# Patient Record
Sex: FEMALE | Race: WHITE | Employment: OTHER | URBAN - METROPOLITAN AREA
[De-identification: names, ages, dates, MRNs, and addresses within clinical notes are randomized per-mention and may not be internally consistent; named-entity substitution may affect disease eponyms.]

---

## 2017-02-15 PROBLEM — N32.81 OVERACTIVE BLADDER: Status: ACTIVE | Noted: 2017-02-15

## 2018-08-29 ENCOUNTER — HOSPITAL ENCOUNTER (OUTPATIENT)
Dept: MAMMOGRAPHY | Age: 73
Discharge: HOME | End: 2018-08-29
Attending: INTERNAL MEDICINE
Payer: MEDICARE

## 2018-08-29 DIAGNOSIS — Z12.31 VISIT FOR SCREENING MAMMOGRAM: ICD-10-CM

## 2018-08-29 PROCEDURE — 77067 SCR MAMMO BI INCL CAD: CPT

## 2018-08-30 ENCOUNTER — HOSPITAL ENCOUNTER (OUTPATIENT)
Dept: PET IMAGING | Age: 73
Discharge: HOME | End: 2018-08-30
Payer: MEDICARE

## 2018-08-30 DIAGNOSIS — R63.4 WEIGHT LOSS: ICD-10-CM

## 2018-08-30 DIAGNOSIS — F17.210 TOBACCO DEPENDENCE DUE TO CIGARETTES: ICD-10-CM

## 2018-08-30 DIAGNOSIS — D38.5 NEOPLASM OF UNCERTAIN BEHAVIOR OF OTHER RESPIRATORY ORGANS: ICD-10-CM

## 2018-08-30 DIAGNOSIS — R91.8 MASS OF RIGHT LUNG: ICD-10-CM

## 2018-08-30 PROCEDURE — A9552 F18 FDG: HCPCS

## 2018-08-30 PROCEDURE — 74011636320 HC RX REV CODE- 636/320: Performed by: INTERNAL MEDICINE

## 2018-08-30 RX ORDER — SODIUM CHLORIDE 0.9 % (FLUSH) 0.9 %
5-10 SYRINGE (ML) INJECTION
Status: COMPLETED | OUTPATIENT
Start: 2018-08-30 | End: 2018-08-30

## 2018-08-30 RX ADMIN — DIATRIZOATE MEGLUMINE AND DIATRIZOATE SODIUM 10 ML: 660; 100 LIQUID ORAL; RECTAL at 11:21

## 2018-08-30 RX ADMIN — Medication 10 ML: at 11:21

## 2018-09-06 PROBLEM — S83.209A CURRENT TEAR OF MENISCUS OF KNEE: Status: ACTIVE | Noted: 2017-06-26

## 2018-09-06 PROBLEM — E11.65 TYPE II OR UNSPECIFIED TYPE DIABETES MELLITUS WITH RENAL MANIFESTATIONS, UNCONTROLLED(250.42) (HCC): Status: ACTIVE | Noted: 2018-05-08

## 2018-09-06 PROBLEM — E11.29 TYPE II OR UNSPECIFIED TYPE DIABETES MELLITUS WITH RENAL MANIFESTATIONS, UNCONTROLLED(250.42) (HCC): Status: ACTIVE | Noted: 2018-05-08

## 2018-09-06 PROBLEM — F42.4 EXCORIATION (SKIN-PICKING) DISORDER: Status: ACTIVE | Noted: 2018-05-08

## 2018-09-06 PROBLEM — J41.0 SIMPLE CHRONIC BRONCHITIS (HCC): Status: ACTIVE | Noted: 2018-09-06

## 2018-09-06 PROBLEM — Z72.0 TOBACCO ABUSE: Status: ACTIVE | Noted: 2018-09-06

## 2018-09-06 PROBLEM — R91.1 PULMONARY NODULE: Status: ACTIVE | Noted: 2018-09-06

## 2018-09-06 PROBLEM — S42.309A CLOSED FRACTURE OF HUMERUS: Status: ACTIVE | Noted: 2017-06-26

## 2020-07-21 ENCOUNTER — HOSPITAL ENCOUNTER (OUTPATIENT)
Dept: MRI IMAGING | Age: 75
Discharge: HOME | End: 2020-07-21
Attending: INTERNAL MEDICINE
Payer: OTHER GOVERNMENT

## 2020-07-21 DIAGNOSIS — M54.50 LOW BACK PAIN: ICD-10-CM

## 2020-07-21 PROCEDURE — 72148 MRI LUMBAR SPINE W/O DYE: CPT

## 2020-08-20 ENCOUNTER — HOSPITAL ENCOUNTER (OUTPATIENT)
Dept: CT IMAGING | Age: 75
Discharge: HOME | End: 2020-08-20
Attending: INTERNAL MEDICINE
Payer: OTHER GOVERNMENT

## 2020-08-20 DIAGNOSIS — S32.10XA SACRAL FRACTURE (HCC): ICD-10-CM

## 2020-08-20 PROCEDURE — 72192 CT PELVIS W/O DYE: CPT

## 2021-07-26 ENCOUNTER — HOSPITAL ENCOUNTER (OUTPATIENT)
Dept: PHYSICAL THERAPY | Age: 76
Discharge: HOME | End: 2021-07-26
Payer: MEDICARE

## 2021-07-26 PROCEDURE — 97535 SELF CARE MNGMENT TRAINING: CPT

## 2021-07-26 PROCEDURE — 97166 OT EVAL MOD COMPLEX 45 MIN: CPT

## 2021-07-26 NOTE — THERAPY EVALUATION
Diana Spears  : 1945  Primary: Anna Trevizo Medicare Advantage  Secondary:  [address]  [facility] [address] [address],[address], [facility] [facility].  [phone]   [phone]         OUTPATIENT OCCUPATIONAL [place] Assessment 2021   ICD-10: Treatment Diagnosis:   Lymphedema, not elsewhere classified (I89.0)  Precautions/Allergies:   Aspirin, Barbiturates, Codeine, Demerol [meperidine], Indomethacin, Penicillins, and Sulfa (sulfonamide antibiotics)   TREATMENT PLAN:  Effective Dates: 2021 TO 10/24/2021 (90 days). Frequency/Duration: 2 times a week for 90 Day(s) MEDICAL/REFERRING DIAGNOSIS:  LYMPHEDEMA   DATE OF ONSET: chronic, worsening over the past 6 months  REFERRING PHYSICIAN: Amina Canas MD MD Orders: evaluate and treat  Return MD Appointment: unknown     INITIAL ASSESSMENT:  Ms. Neri Yeh presents with pain and edema in bilateral LEs; she is limited in her mobility and spends most of her day in her wheelchair with her legs in a gravity-dependent position. Feel she may benefit from skilled occupational therapy to maximize functional independence with home management of lymphedema. PROBLEM LIST (Impacting functional limitations):  1. Decreased Strength  2. Decreased ADL/Functional Activities  3. Increased Pain  4. Edema/Girth  5. Decreased Skin Integrity/Hygeine INTERVENTIONS PLANNED: (Treatment may consist of any combination of the following)  1. Activities of daily living training  2. Manual therapy training  3. Therapeutic activity  4. Therapeutic exercise     GOALS: (Goals have been discussed and agreed upon with patient.)  Short-Term Functional Goals: Time Frame: 45 days  1. The patient/caregiver will verbalize understanding of lymphedema precautions. 2. Patient will be independent with skin care regimen. 3. Patient will be independent in lymphatic exercises. Discharge Goals: Time Frame: 90 days  1.  Patient's bilateral lower extremity circumferential measurements will decrease on volumetric graph by 1-3 cm to maximize functional use in ADL's.   2. The patient/caregiver will be independent with home management of lymphedema. 3. Patient/caregiver will be independent donning and doffing bilateral lower extremity compression garment. OUTCOME MEASURE:   Tool Used: Lymphedema Life Impact Scale   Score:  Initial: 47 Most Recent: X (Date: -- )   Interpretation of Score: The Lymphedema Life Impact Scale (LLIS) is a validated instrument that measures the physical, functional, and psychosocial concerns pertinent to patients with extremity lymphedema. The Scale's questionnaire is administered to patients to gauge impairments, activity limitations, and participation restrictions resulting from their lymphedema. MEDICAL NECESSITY:   · Patient demonstrates fair rehab potential due to higher previous functional level. REASON FOR SERVICES/OTHER COMMENTS:  · Patient continues to require skilled intervention due to decreased independence with home management of lymphedema. Total Duration:  OT Patient Time In/Time Out  Time In: 1525  Time Out: 1615    Rehabilitation Potential For Stated Goals: Fair  Regarding Rosario Rivera's therapy, I certify that the treatment plan above will be carried out by a therapist or under their direction. Thank you for this referral,  Tara Saucedo OT     Referring Physician Signature: Teresa Patel MD _______________________________ Date _____________     PAIN/SUBJECTIVE:   Initial: Pain Intensity 1: 7  Pain Location 1: Leg, Foot  Pain Orientation 1: Right, Left   Post Session:  7/10   OCCUPATIONAL PROFILE & HISTORY:   History of Injury/Illness (Reason for Referral):  Patient reports chronic neuropathy pain and edema in LEs, worsening over the past 6 months to a year. She reports difficulty with all ADLs and iADLs and does not have assistance at home.  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 Past Medical History/Comorbidities:   Ms. Li Zazueta  has a past medical history of Diabetes ([address] Hypertension, and Lung cancer ([facility] [place] [address] (2017). Ms. Li Zazueta  has no past surgical history on file. Social History/Living Environment:   Home Environment: Private residence  # Steps to Enter: 3  Rails to Enter: Yes  Hand Rails : Bilateral  One/Two Story Residence: One story  Living Alone: Yes  Support Systems: Family member(s)  Current DME Used/Available at Home: Hospital bed, Shower chair, Walker, rollator, Wheelchair, Raised toilet seat, Grab bars (Pt. doesn't use rollator)  Tub or Shower Type: Tub/Shower combination  Prior Level of Function/Work/Activity:  Disabled    Personal Factors:          Sex:  female        Age:  67 y.o. Ambulatory/Rehab Services H2 Model Falls Risk Assessment   Risk Factors:       No Risk Factors Identified Ability to Rise from Chair:       (3)  Multiple attempts, but successful   Falls Prevention Plan: Mobility Assistance Device (specify):  required wheelchair assistance from car to therapy clinic today   Total: (5 or greater = High Risk): 3   ©2010 [facility] [place] [address] [place] [address] States Patent #[id number]. Federal Law prohibits the replication, distribution or use without written permission from [facility] of [employer]   Current Medications:       Current Outpatient Medications:     albuterol (PROVENTIL HFA, VENTOLIN HFA, PROAIR HFA) 90 mcg/actuation inhaler, Take 2 Puffs by inhalation every four (4) hours as needed for Wheezing., Disp: 1 Inhaler, Rfl: 11    tolterodine ER (DETROL LA) 4 mg ER capsule, Take 1 Cap by mouth daily. , Disp: 30 Cap, Rfl: 11    solifenacin (VESICARE) 10 mg tablet, Take 1 Tab by mouth daily. , Disp: 30 Tab, Rfl: 12    insulin glargine (LANTUS SOLOSTAR) 100 unit/mL (3 mL) pen, by SubCUTAneous route., Disp: , Rfl:     mirabegron ER (MYRBETRIQ) 50 mg ER tablet, Take 1 Tab by mouth daily. , Disp: 30 Tab, Rfl: 12    naproxen (NAPROSYN) 500 mg tablet, Take 500 mg by mouth two (2) times daily (with meals). , Disp: , Rfl:     gabapentin (NEURONTIN) 100 mg capsule, Take 100 mg by mouth daily. , Disp: , Rfl:     simvastatin (ZOCOR) 40 mg tablet, Take 40 mg by mouth nightly., Disp: , Rfl:     lisinopril (PRINIVIL, ZESTRIL) 10 mg tablet, Take 10 mg by mouth daily. , Disp: , Rfl:     loratadine 10 mg cap, Take 10 mg by mouth daily. , Disp: , Rfl:     docusate sodium (COLACE) 100 mg capsule, Take 100 mg by mouth two (2) times a day., Disp: , Rfl:     FLUoxetine (PROZAC) 20 mg tablet, Take 20 mg by mouth daily. , Disp: , Rfl:     omeprazole (PRILOSEC) 20 mg capsule, Take 20 mg by mouth daily. , Disp: , Rfl:     guaiFENesin (ORGANIDIN) 400 mg tablet, Take 400 mg by mouth two (2) times a day., Disp: , Rfl:     phenazopyridine (PYRIDIUM) 100 mg tablet, Take 100 mg by mouth as needed for Pain., Disp: , Rfl:     insulin NPH (NOVOLIN N) 100 unit/mL injection, by SubCUTAneous route once., Disp: , Rfl:    Date Last Reviewed: 7/26/2021     Complexity Level: Expanded review of therapy/medical records (1-2):  MODERATE COMPLEXITY   ASSESSMENT OF OCCUPATIONAL PERFORMANCE:   Palpation:          Notable for pitting edema in bilateral LEs  ROM:          WDLs  Strength:          Grossly 3+/5 bilateral LEs  Skin Integrity:          Noted thickening hyperpigmentation around skin folds of feet; patient reports it is difficult for her to bathe her feet  Mental Status:             · Neurologic State: Alert  · Orientation Level: Oriented X4  · Cognition: Appropriate decision making  · Perception: Appears intact  · Perseveration: Perseverates during conversation  · Safety/Judgement: Insight into deficits     Edema/Girth:  pitting    Left Right    Initial Most Recent Initial Most Recent   Upper  Extremity           Lower  Extremity 5th Tuberosity (cm): 21  Ankle (cm): 27.5  Calf (cm): 43.7  Mid Knee (cm): 37.3 5th Tuberosity (cm): 21.2 (18 cm from base of heel)  Ankle (cm): 27.5 (10 cm from base of heel)  Calf (cm): 43.2 (30 cm from base of heel)  Mid Knee (cm): 38.6 (40 cm from base of heel)        Activities of Daily Living:           Basic ADLs (From Assessment) Complex ADLs (From Assessment)   Basic ADL  Feeding: Modified independent  Oral Facial Hygiene/Grooming: Modified Independent  Bathing: Modified independent, Additional time  Type of Bath: Shower  Upper Body Dressing: Modified independent, Additional time  Lower Body Dressing: Modified independent, Additional time  Toileting: Modified independent, Additional time Instrumental ADL  Meal Preparation: Modified independent (very simple meals)  Homemaking:  (Patient reports she needs help with housework)   Grooming/Bathing/Dressing Activities of Daily Living     Cognitive Retraining  Safety/Judgement: Insight into deficits                 Functional Transfers  Bathroom Mobility: Modified independent  Toilet Transfer : Modified independent  Tub Transfer: Modified independent; Adaptive equipment; Additional time  Car Transfer: Modified independent     Bed/Mat Mobility  Rolling: Independent  Supine to Sit: Modified independent (Uses trapeize bar)  Sit to Supine: Modified independent  Sit to Stand: Modified independent  Stand to Sit: Modified independent  Bed to Chair: Modified independent  Scooting: Modified independent     Sensation:         Patient reports neuropathy in bilateral LEs   Physical Skills Involved:  1. Strength  2. Sensation  3. Pain (Chronic)  4. Edema  5. Skin Integrity Cognitive Skills Affected (resulting in the inability to perform in a timely and safe manner):  1. None Psychosocial Skills Affected:  1. Habits/Routines  2.  Environmental Adaptation   Number of elements that affect the Plan of Care[de-identified] 5+:  HIGH COMPLEXITY   CLINICAL DECISION MAKING:   Clinical Decision-Making Assessment:  Patient's limited mobility and lack of assistance may impact her ability to progress toward therapy goals.    Assessment process, impact of co-morbidities, assessment modification\need for assistance, and selection of interventions: Analytical Complexity:MODERATE COMPLEXITY

## 2021-07-29 ENCOUNTER — HOSPITAL ENCOUNTER (OUTPATIENT)
Dept: PHYSICAL THERAPY | Age: 76
Discharge: HOME | End: 2021-07-29
Payer: MEDICARE

## 2021-07-29 PROCEDURE — 97535 SELF CARE MNGMENT TRAINING: CPT

## 2021-07-29 PROCEDURE — 97140 MANUAL THERAPY 1/> REGIONS: CPT

## 2021-07-29 NOTE — PROGRESS NOTES
Vani Score  : 1945  Primary: Katherine Jenkins Medicare Advantage  Secondary:  [address]  at [facility]  [address] [facility], [address] [place] [address],[address], [address] [facility]  [phone]   [phone]       OUTPATIENT OCCUPATIONAL THERAPY: Daily Treatment Note 2021  Visit Count:  2    ICD-10: Treatment Diagnosis:   Lymphedema, not elsewhere classified (I89.0)  Precautions/Allergies:   Aspirin, Barbiturates, Codeine, Demerol [meperidine], Indomethacin, Penicillins, and Sulfa (sulfonamide antibiotics)   TREATMENT PLAN:  Effective Dates: 2021 TO 10/24/2021 (90 days). Frequency/Duration: 2 times a week for 90 Day(s)    Pre-treatment Symptoms/Complaints:  Patient reports she brought larger bedroom shoes to wear with compression bandages today. Pain: Initial: Pain Intensity 1: 0  Post Session:  0/10   Medications Last Reviewed:  2021  Updated Objective Findings:  see below   Edema/Girth:  pitting    Left Right    Initial Most Recent Initial Most Recent   Upper  Extremity           Lower  Extremity                TREATMENT:     Manual Therapy: (25 minutes): Manual lymphatic drainage was utilized and necessary because of the patient's bilateral LE lymphedema. Completed manual lymph drainage (MLD) of the LEs, beginning with static stationary circles at the inguinal nodes, then dynamic stationary circles from distal to proximal on the thigh, then the knee, the lower leg, ankles, then dorsum of feet, 10 reps each. Self Care: (30 minutes): Procedure(s) (per grid) utilized to improve and/or restore self-care/home management as related to home management of lymphedema. Required maximal verbal cueing to facilitate understanding of lymphedema plan of care.   Patient's legs wrapped from base of toes to just below the popliteal crease with cotton stockinette and padding then short-stretch compression bandages; educated her to wear until next appointment unless painful then remove; she verbalized and/or demonstrated understanding of all education. Patient completed functional mobility community distance from treatment room to car and car transfer with SBA-CGA for safety. Treatment/Session Summary:    · Response to Treatment:  Patient initially tolerating MLD and compression bandaging without complaint. · Communication/Consultation:  None today  · Equipment provided today:  None today  · Recommendations/Intent for next treatment session: Next visit will focus on maximizing independence with home management of lymphedema.     Total Treatment Billable Duration:  55 minutes  OT Patient Time In/Time Out  Time In: 9825  Time Out: 224 Esther Hollins OT    Future Appointments   Date Time Provider Cesia Ramon   8/2/2021  3:15 PM Bryant Strange, OT [facility]   8/5/2021  3:15 PM Bryant Strange, OT [facility]   8/9/2021  2:30 PM Bryant Strange, OT [facility]   8/11/2021  3:15 PM Bryant Strnage, OT [facility]   8/16/2021  3:15 PM Bryant Strange, OT [facility]   8/19/2021  3:15 PM Bryant Strange, OT [facility]

## 2021-08-02 ENCOUNTER — HOSPITAL ENCOUNTER (OUTPATIENT)
Dept: PHYSICAL THERAPY | Age: 76
Discharge: HOME | End: 2021-08-02
Payer: MEDICARE

## 2021-08-02 PROCEDURE — 97140 MANUAL THERAPY 1/> REGIONS: CPT

## 2021-08-02 PROCEDURE — 97535 SELF CARE MNGMENT TRAINING: CPT

## 2021-08-02 NOTE — PROGRESS NOTES
Tameka Burn  : 1945  Primary: Shravan Cerna Medicare Advantage  Secondary:  [address]  at [id number] [facility], [address] [place] [address],[address], [place] [address].  [phone]   [phone]       OUTPATIENT OCCUPATIONAL THERAPY: Daily Treatment Note 2021  Visit Count:  3    ICD-10: Treatment Diagnosis:   Lymphedema, not elsewhere classified (I89.0)  Precautions/Allergies:   Aspirin, Barbiturates, Codeine, Demerol [meperidine], Indomethacin, Penicillins, and Sulfa (sulfonamide antibiotics)   TREATMENT PLAN:  Effective Dates: 2021 TO 10/24/2021 (90 days). Frequency/Duration: 2 times a week for 90 Day(s)    Pre-treatment Symptoms/Complaints:  Patient reports she found shoes that Velcro to wear with compression bandages. Pain: Initial: Pain Intensity 1: 0  Post Session:  0/10   Medications Last Reviewed:  2021  Updated Objective Findings:  see below   Edema/Girth:  pitting    Left Right    Initial Most Recent Initial Most Recent   Upper  Extremity           Lower  Extremity 5th Tuberosity (cm): 20.2  Ankle (cm): 24.4  Calf (cm): 41  Mid Knee (cm): 39.9   5th Tuberosity (cm): 20.2  Ankle (cm): 24.6  Calf (cm): 40  Mid Knee (cm): 39.2          TREATMENT:     Manual Therapy: (25 minutes): Manual lymphatic drainage was utilized and necessary because of the patient's bilateral LE lymphedema. Completed manual lymph drainage (MLD) of the LEs, beginning with static stationary circles at the inguinal nodes, then dynamic stationary circles from distal to proximal on the thigh, then the knee, the lower leg, ankles, then dorsum of feet, 10 reps each. Self Care: (35 minutes): Procedure(s) (per grid) utilized to improve and/or restore self-care/home management as related to home management of lymphedema. Required maximal verbal cueing to facilitate understanding of lymphedema plan of care. Removed compression bandages; assessed skin and took circumference measurements.  Washed legs and applied low pH lotion. Patient's legs wrapped from base of toes to just below the popliteal crease with cotton stockinette and padding then short-stretch compression bandages; educated her to wear until next appointment unless painful then remove; she verbalized and/or demonstrated understanding of all education. Treatment/Session Summary:    · Response to Treatment:  Patient tolerating MLD and compression bandaging without complaint with a decrease in LE circumference measurements noted. · Communication/Consultation:  None today  · Equipment provided today:  None today  · Recommendations/Intent for next treatment session: Next visit will focus on maximizing independence with home management of lymphedema.     Total Treatment Billable Duration:  60 minutes  OT Patient Time In/Time Out  Time In: 6511  Time Out: 0796 [address] [place] [address],     Future Appointments   Date Time Provider Cesia Ramon   8/5/2021  3:15 PM LUIS Chaney   8/9/2021  2:30 PM Bryant Strange OT [facility]   8/11/2021  3:15 PM LUIS Chaney   8/16/2021  3:15 PM Bryant Strange OT [facility]   8/19/2021  3:15 PM Bryant Strange OT DAVE ROMANO

## 2021-08-05 ENCOUNTER — HOSPITAL ENCOUNTER (OUTPATIENT)
Dept: PHYSICAL THERAPY | Age: 76
Discharge: HOME | End: 2021-08-05
Payer: MEDICARE

## 2021-08-05 PROCEDURE — 97535 SELF CARE MNGMENT TRAINING: CPT

## 2021-08-05 PROCEDURE — 97140 MANUAL THERAPY 1/> REGIONS: CPT

## 2021-08-05 NOTE — PROGRESS NOTES
Erika Mak  : 1945  Primary: Garrick Caceres Medicare Advantage  Secondary:  [address] at [address] [place] [address] [address],[address] [place] [address].  [phone]   [phone]       OUTPATIENT OCCUPATIONAL THERAPY: Daily Treatment Note 2021  Visit Count:  4    ICD-10: Treatment Diagnosis:   Lymphedema, not elsewhere classified (I89.0)  Precautions/Allergies:   Aspirin, Barbiturates, Codeine, Demerol [meperidine], Indomethacin, Penicillins, and Sulfa (sulfonamide antibiotics)   TREATMENT PLAN:  Effective Dates: 2021 TO 10/24/2021 (90 days). Frequency/Duration: 2 times a week for 90 Day(s)    Pre-treatment Symptoms/Complaints:  Patient reports she has had less itching on her legs since last visit. Pain: Initial: Pain Intensity 1: 0  Post Session:  0/10   Medications Last Reviewed:  2021  Updated Objective Findings:  see below   Edema/Girth:  pitting    Left Right    Initial Most Recent Initial Most Recent   Upper  Extremity           Lower  Extremity 5th Tuberosity (cm): 20.1  Ankle (cm): 23.7  Calf (cm): 39.8  Mid Knee (cm): 39.9   5th Tuberosity (cm): 20.3  Ankle (cm): 24  Calf (cm): 39.4  Mid Knee (cm): 39.8          TREATMENT:     Manual Therapy: (30 minutes): Manual lymphatic drainage was utilized and necessary because of the patient's bilateral LE lymphedema. Completed manual lymph drainage (MLD) of the LEs, beginning with static stationary circles at the inguinal nodes, then dynamic stationary circles from distal to proximal on the thigh, then the knee, the lower leg, ankles, then dorsum of feet, 10 reps each. Self Care: (30 minutes): Procedure(s) (per grid) utilized to improve and/or restore self-care/home management as related to home management of lymphedema. Required maximal verbal cueing to facilitate understanding of lymphedema plan of care. Removed compression bandages; assessed skin and took circumference measurements.  Washed legs and applied low pH lotion. Patient's legs wrapped from base of toes to just below the popliteal crease with cotton stockinette and padding then short-stretch compression bandages; educated her to wear until next appointment unless painful then remove; she verbalized and/or demonstrated understanding of all education. Treatment/Session Summary:    · Response to Treatment:  Patient tolerating MLD and compression bandaging without complaint with a continued decrease in LE circumference measurements noted. · Communication/Consultation:  None today  · Equipment provided today:  None today  · Recommendations/Intent for next treatment session: Next visit will focus on maximizing independence with home management of lymphedema.     Total Treatment Billable Duration:  60 minutes  OT Patient Time In/Time Out  Time In: 2983  Time Out: [address], OT    Future Appointments   Date Time Provider Cesia Ramon   8/9/2021  2:30 PM Dmitry Dias OT [facility] JUAN ANTONIO   8/11/2021  3:15 PM LUIS Leo   8/16/2021  2:30 PM Dmitry Dias OT [facility] JUAN ANTONIO   8/19/2021  2:30 PM LUIS Leo

## 2021-08-09 ENCOUNTER — HOSPITAL ENCOUNTER (OUTPATIENT)
Dept: PHYSICAL THERAPY | Age: 76
Discharge: HOME | End: 2021-08-09
Payer: MEDICARE

## 2021-08-09 PROCEDURE — 97140 MANUAL THERAPY 1/> REGIONS: CPT

## 2021-08-09 PROCEDURE — 97535 SELF CARE MNGMENT TRAINING: CPT

## 2021-08-09 NOTE — PROGRESS NOTES
Eula Mondragon  : 1945  Primary: Kristine Sprain Medicare Advantage  Secondary:  [address]  at [place] [address],[address] [place] [address].  [phone]   [phone]       OUTPATIENT OCCUPATIONAL THERAPY: Daily Treatment Note 2021  Visit Count:  5    ICD-10: Treatment Diagnosis:   Lymphedema, not elsewhere classified (I89.0)  Precautions/Allergies:   Aspirin, Barbiturates, Codeine, Demerol [meperidine], Indomethacin, Penicillins, and Sulfa (sulfonamide antibiotics)   TREATMENT PLAN:  Effective Dates: 2021 TO 10/24/2021 (90 days). Frequency/Duration: 2 times a week for 90 Day(s)    Pre-treatment Symptoms/Complaints:  Patient reports her left knee has been bothering her. Pain: Initial: Pain Intensity 1: 0  Post Session:  0/10   Medications Last Reviewed:  2021  Updated Objective Findings:  see below   Edema/Girth:  pitting    Left Right    Initial Most Recent Initial Most Recent   Upper  Extremity           Lower  Extremity 5th Tuberosity (cm): 20.3  Ankle (cm): 23.7  Calf (cm): 38.9  Mid Knee (cm): 39.9   5th Tuberosity (cm): 20.3  Ankle (cm): 23.8  Calf (cm): 39  Mid Knee (cm): 39.3          TREATMENT:     Manual Therapy: (20 minutes): Manual lymphatic drainage was utilized and necessary because of the patient's bilateral LE lymphedema. Completed manual lymph drainage (MLD) of the LEs, beginning with static stationary circles at the inguinal nodes, then dynamic stationary circles from distal to proximal on the thigh, then the knee, the lower leg, ankles, then dorsum of feet, 5-10 reps each. Self Care: (25 minutes): Procedure(s) (per grid) utilized to improve and/or restore self-care/home management as related to home management of lymphedema. Required maximal verbal cueing to facilitate understanding of lymphedema plan of care. Removed compression bandages; assessed skin and took circumference measurements.  Washed legs and applied low pH lotion. Patient's legs wrapped from base of toes to just below the popliteal crease with cotton stockinette and padding then short-stretch compression bandages; educated her to wear until next appointment unless painful then remove; she verbalized and/or demonstrated understanding of all education. Treatment/Session Summary:    · Response to Treatment:  Patient tolerating MLD and compression bandaging without complaint with a continued decrease in LE circumference measurements noted. Gave [facility] re: obtaining static compression garments once circumference measurements plateau. · Communication/Consultation:  None today  · Equipment provided today:  None today  · Recommendations/Intent for next treatment session: Next visit will focus on maximizing independence with home management of lymphedema.     Total Treatment Billable Duration:  45 minutes  OT Patient Time In/Time Out  Time In: 0785  Time Out: [address] [place] [address],     Future Appointments   Date Time Provider Cesia Ramon   8/11/2021  3:15 PM Dmitry Dias OT [facility] JUAN ANTONIO   8/16/2021  2:30 PM Dmitry Dias OT [facility] SFABBY   8/19/2021  2:30 PM LUIS Leo E

## 2021-08-11 ENCOUNTER — HOSPITAL ENCOUNTER (OUTPATIENT)
Dept: PHYSICAL THERAPY | Age: 76
Discharge: HOME | End: 2021-08-11
Payer: MEDICARE

## 2021-08-11 NOTE — PROGRESS NOTES
Ermiasmorales Gallegos  : 1945  Primary: Lexie Ball Medicare Advantage  Secondary:  [address] at [place] [address] [place] [address],[address] [facility], [place] [address].  [phone]   [phone]       Ms. Arik Horner cancelled today's appointment; no reason was given.      José Luis Butts OT

## 2021-08-16 ENCOUNTER — HOSPITAL ENCOUNTER (OUTPATIENT)
Dept: PHYSICAL THERAPY | Age: 76
Discharge: HOME | End: 2021-08-16
Payer: MEDICARE

## 2021-08-16 PROCEDURE — 97535 SELF CARE MNGMENT TRAINING: CPT

## 2021-08-16 PROCEDURE — 97140 MANUAL THERAPY 1/> REGIONS: CPT

## 2021-08-16 NOTE — PROGRESS NOTES
Silvia Mckee  : 1945  Primary: Karmen Deal Medicare Advantage  Secondary:  [address]  at [place] [address] [facility], [address] [place] [address],[address], [place] [address].  [phone]   [phone]       OUTPATIENT OCCUPATIONAL THERAPY: Daily Treatment Note 2021  Visit Count:  6    ICD-10: Treatment Diagnosis:   Lymphedema, not elsewhere classified (I89.0)  Precautions/Allergies:   Aspirin, Barbiturates, Codeine, Demerol [meperidine], Indomethacin, Penicillins, and Sulfa (sulfonamide antibiotics)   TREATMENT PLAN:  Effective Dates: 2021 TO 10/24/2021 (90 days). Frequency/Duration: 2 times a week for 90 Day(s)    Pre-treatment Symptoms/Complaints:  Patient reports she had to cancel her last appointment due to lack of gas money. She reports she removed the compression bandages two days ago and washed them. Pain: Initial: Pain Intensity 1: 0  Post Session:  0/10   Medications Last Reviewed:  2021  Updated Objective Findings:  see below   Edema/Girth:  pitting    Left Right    Initial Most Recent Initial Most Recent   Upper  Extremity           Lower  Extremity                TREATMENT:     Manual Therapy: (35 minutes): Manual lymphatic drainage was utilized and necessary because of the patient's bilateral LE lymphedema. Completed manual lymph drainage (MLD) of the LEs, beginning with static stationary circles at the inguinal nodes, then dynamic stationary circles from distal to proximal on the thigh, then the knee, the anterior and posterior lower leg, ankles, then dorsum of feet, 10 reps each. Self Care: (25 minutes): Procedure(s) (per grid) utilized to improve and/or restore self-care/home management as related to home management of lymphedema. Required maximal verbal cueing to facilitate understanding of lymphedema plan of care. Assessed skin. Completed MLD as noted above. Applied low pH lotion to lower legs.  Patient's legs wrapped from base of toes to just below the popliteal crease with cotton stockinette and padding then short-stretch compression bandages; educated her to wear until next appointment unless painful then remove; she verbalized and/or demonstrated understanding of all education. Treatment/Session Summary:    · Response to Treatment:  [facility] to potentially provide static compression garments once circumference measurements stabilize. Continue with complete decongestive therapy. · Communication/Consultation:  None today  · Equipment provided today:  None today  · Recommendations/Intent for next treatment session: Next visit will focus on maximizing independence with home management of lymphedema.     Total Treatment Billable Duration:  60 minutes  OT Patient Time In/Time Out  Time In: 4442  Time Out: [address], [place]    Future Appointments   Date Time Provider Cesia Ramon   8/19/2021  2:30 PM Claribel Robledo OT [facility]     Visit # Therapist initials Date Arrived NS/ Cx < 24 hr >24 hr Cx Visit Comments   1 SP 7/26 x   Initial Assessment and Treatment  $35 co-pay   2 SP 7/29 x      3 SP 8/2 x      4 SP 8/5 x      5 SP 8/9 x       SP 8/11  x     6 SP 8/16 x                                                                                                           Abbreviations: NS = No Show; CX = cancelled

## 2021-08-19 ENCOUNTER — HOSPITAL ENCOUNTER (OUTPATIENT)
Dept: PHYSICAL THERAPY | Age: 76
Discharge: HOME | End: 2021-08-19
Payer: MEDICARE

## 2021-08-19 PROCEDURE — 97140 MANUAL THERAPY 1/> REGIONS: CPT

## 2021-08-19 PROCEDURE — 97535 SELF CARE MNGMENT TRAINING: CPT

## 2021-08-19 NOTE — PROGRESS NOTES
Diana Spears  : 1945  Primary: Anna Trevizo Medicare Advantage  Secondary:  [address]  [facility], [address] [address],[address], [place] [address].  [phone]   [phone]       OUTPATIENT OCCUPATIONAL THERAPY: Daily Treatment Note 2021  Visit Count:  7    ICD-10: Treatment Diagnosis:   Lymphedema, not elsewhere classified (I89.0)  Precautions/Allergies:   Aspirin, Barbiturates, Codeine, Demerol [meperidine], Indomethacin, Penicillins, and Sulfa (sulfonamide antibiotics)   TREATMENT PLAN:  Effective Dates: 2021 TO 10/24/2021 (90 days). Frequency/Duration: 2 times a week for 90 Day(s)    Pre-treatment Symptoms/Complaints:  Patient reports she may not be able to continue outpatient therapy due to not being able to pay her co-pay. OT gave patient application for hospital financial assistance. Pain: Initial: Pain Intensity 1: 0  Post Session:  0/10   Medications Last Reviewed:  2021  Updated Objective Findings:  see below   Edema/Girth:  pitting    Left Right    Initial Most Recent Initial Most Recent   Upper  Extremity           Lower  Extremity 5th Tuberosity (cm): 20.8  Ankle (cm): 24.5  Calf (cm): 40  Mid Knee (cm): 39.3   5th Tuberosity (cm): 20.5  Ankle (cm): 24.5  Calf (cm): 38.8  Mid Knee (cm): 38.6          TREATMENT:     Manual Therapy: (30 minutes): Manual lymphatic drainage was utilized and necessary because of the patient's bilateral LE lymphedema. Completed manual lymph drainage (MLD) of the LEs, beginning with static stationary circles at the inguinal nodes, then dynamic stationary circles from distal to proximal on the thigh, then the knee, the anterior and posterior lower leg, ankles, then dorsum of feet, 10 reps each. Self Care: (30 minutes): Procedure(s) (per grid) utilized to improve and/or restore self-care/home management as related to home management of lymphedema.  Required maximal verbal cueing to facilitate understanding of lymphedema plan of care. Removed compression bandages. Assessed skin and took circumference measurements; washed LEs. Completed MLD as noted above. Applied low pH lotion to lower legs. Patient's legs wrapped from base of toes to just below the popliteal crease with cotton stockinette and padding then short-stretch compression bandages; educated her to wear until next appointment unless painful then remove; she verbalized and/or demonstrated understanding of all education. Treatment/Session Summary:    · Response to Treatment:  Patient awaiting [facility] to potentially provide static compression garments once circumference measurements stabilize. Continue with complete decongestive therapy as patient is able to attend. · Communication/Consultation:  None today  · Equipment provided today:  None today  · Recommendations/Intent for next treatment session: Next visit will focus on maximizing independence with home management of lymphedema. Total Treatment Billable Duration:  60 minutes  OT Patient Time In/Time Out  Time In: 1430  Time Out: 105 [facility] [place], OT    No future appointments.   Visit # Therapist initials Date Arrived NS/ Cx < 24 hr >24 hr Cx Visit Comments   1 SP 7/26 x   Initial Assessment and Treatment  $35 co-pay   2 SP 7/29 x      3 SP 8/2 x      4 SP 8/5 x      5 SP 8/9 x       SP 8/11  x     6 SP 8/16 x      7 SP 8/19 x                                                                                                  Abbreviations: NS = No Show; CX = cancelled

## 2021-08-25 ENCOUNTER — HOSPITAL ENCOUNTER (OUTPATIENT)
Dept: PHYSICAL THERAPY | Age: 76
Discharge: HOME | End: 2021-08-25
Payer: MEDICARE

## 2021-08-25 PROCEDURE — 97140 MANUAL THERAPY 1/> REGIONS: CPT

## 2021-08-25 PROCEDURE — 97535 SELF CARE MNGMENT TRAINING: CPT

## 2021-08-25 NOTE — PROGRESS NOTES
Piedad العلي  : 1945  Primary: Rossy Lockhart Medicare Advantage  Secondary:  [address] at [place] [address] [facility], [address] [place] [address],[address], [place] [address].  [phone]   [phone]       OUTPATIENT OCCUPATIONAL THERAPY: Daily Treatment Note 2021  Visit Count:  8    ICD-10: Treatment Diagnosis:   Lymphedema, not elsewhere classified (I89.0)  Precautions/Allergies:   Aspirin, Barbiturates, Codeine, Demerol [meperidine], Indomethacin, Penicillins, and Sulfa (sulfonamide antibiotics)   TREATMENT PLAN:  Effective Dates: 2021 TO 10/24/2021 (90 days). Frequency/Duration: 2 times a week for 90 Day(s)    Pre-treatment Symptoms/Complaints:  Patient reports she forgot to bring her therapy compression bandages today. Pain: Initial: Pain Intensity 1: 0  Post Session:  0/10   Medications Last Reviewed:  2021  Updated Objective Findings:  see below   Edema/Girth:  pitting    Left Right    Initial Most Recent Initial Most Recent   Upper  Extremity           Lower  Extremity 5th Tuberosity (cm): 21.1  Ankle (cm): 23  Calf (cm): 37  Mid Knee (cm): 38   5th Tuberosity (cm): 21  Ankle (cm): 22.5  Calf (cm): 36.7  Mid Knee (cm): 37.8          TREATMENT:     Manual Therapy: (30 minutes): Manual lymphatic drainage was utilized and necessary because of the patient's bilateral LE lymphedema. Completed manual lymph drainage (MLD) of the LEs, beginning with static stationary circles at the inguinal nodes, then dynamic stationary circles from distal to proximal on the thigh, then the knee, the anterior and posterior lower leg, ankles, then dorsum of feet, 10 reps each. Self Care: (20 minutes): Procedure(s) (per grid) utilized to improve and/or restore self-care/home management as related to home management of lymphedema. Required maximal verbal cueing to facilitate understanding of lymphedema plan of care. Patient arrives wearing compression bandages from MD's office.  Removed compression bandages. Assessed skin and took circumference measurements. Completed MLD as noted above. Patient's legs wrapped from base of toes to just below the popliteal crease with stockinette and padding then compression bandages from MD's office because she did not bring her short-stretch compression bandages; educated her to wear until next appointment unless painful then remove; she verbalized and/or demonstrated understanding of all education. Treatment/Session Summary:    · Response to Treatment:  Patient awaiting [facility] to potentially provide static compression garments once circumference measurements stabilize. Noted continued improvement in LE circumference measurements. Continue with complete decongestive therapy as patient is able to attend. · Communication/Consultation:  None today  · Equipment provided today:  None today  · Recommendations/Intent for next treatment session: Next visit will focus on maximizing independence with home management of lymphedema.     Total Treatment Billable Duration:  50 minutes  OT Patient Time In/Time Out  Time In: 1310  Time Out: 200 [facility], OT    Future Appointments   Date Time Provider Cesia Ramon   8/27/2021  1:00 PM Orlin Hawley OT [facility]     Visit # Therapist initials Date Arrived NS/ Cx < 24 hr >24 hr Cx Visit Comments   1 SP 7/26 x   Initial Assessment and Treatment  $35 co-pay   2 SP 7/29 x      3 SP 8/2 x      4 SP 8/5 x      5 SP 8/9 x       SP 8/11  x     6 SP 8/16 x      7 SP 8/19 x      8 SP 8/25 x                                                                                         Abbreviations: NS = No Show; CX = cancelled

## 2021-08-27 ENCOUNTER — HOSPITAL ENCOUNTER (OUTPATIENT)
Dept: PHYSICAL THERAPY | Age: 76
Discharge: HOME | End: 2021-08-27
Payer: MEDICARE

## 2021-08-27 NOTE — PROGRESS NOTES
Erika Mak  : 1945  Primary: Garrick Caceres Medicare Advantage  Secondary:  [address] at [place] [address] [place] [address],[address] [facility], [facility] [address]  [phone]   [phone]          OUTPATIENT DAILY NOTE    NAME/AGE/GENDER: Erika Mak is a 67 y.o. female. DATE: 2021    Patient cancelled (less than 24 hours ago) her appointment for today due to unknown reasons. Will plan to follow up on next scheduled visit. Eran Zavala OT    No future appointments.

## 2021-09-02 ENCOUNTER — HOSPITAL ENCOUNTER (OUTPATIENT)
Dept: PHYSICAL THERAPY | Age: 76
Discharge: HOME | End: 2021-09-02
Payer: MEDICARE

## 2021-09-02 PROCEDURE — 97140 MANUAL THERAPY 1/> REGIONS: CPT

## 2021-09-02 PROCEDURE — 97535 SELF CARE MNGMENT TRAINING: CPT

## 2021-09-08 ENCOUNTER — HOSPITAL ENCOUNTER (OUTPATIENT)
Dept: PHYSICAL THERAPY | Age: 76
Discharge: HOME | End: 2021-09-08
Payer: MEDICARE

## 2021-09-08 PROCEDURE — 97535 SELF CARE MNGMENT TRAINING: CPT

## 2021-09-08 PROCEDURE — 97140 MANUAL THERAPY 1/> REGIONS: CPT

## 2021-09-08 NOTE — THERAPY RECERTIFICATION
Italo Barrett  : 1945  Primary: Margot [place] Medicare Hmo  Secondary:  [address]  at [facility]  [address] [facility], [address] [place] [address],[address], [address] [facility]  [phone]   [phone]         OUTPATIENT OCCUPATIONAL [address]   ICD-10: Treatment Diagnosis:   Lymphedema, not elsewhere classified (I89.0)  Precautions/Allergies:   Aspirin, Barbiturates, Codeine, Demerol [meperidine], Indomethacin, Penicillins, and Sulfa (sulfonamide antibiotics)   TREATMENT PLAN:  Effective Dates: 2021 TO 2021 (90 days). Frequency/Duration: 2 times a week for 90 Day(s) MEDICAL/REFERRING DIAGNOSIS:  LYMPHEDEMA   DATE OF ONSET: chronic, worsening over the past 6 months  REFERRING PHYSICIAN: June Jacob MD MD Orders: evaluate and treat  Return MD Appointment: unknown   21: Ms. Li Zazueta is making good progress toward her goals; she has been able to participate in compression bandaging and manual lymph drainage although she is unable to complete either at home between therapy visits as she is physically unable to do herself and she does not have assistance at home. Static compression garments for LEs have been ordered; awaiting arrival. Feel Ms. Li Zazueta may continue to benefit from skilled occupational therapy in order to maximize independence with home management of lymphedema. INITIAL ASSESSMENT:  Ms. Li Zazueta presents with pain and edema in bilateral LEs; she is limited in her mobility and spends most of her day in her wheelchair with her legs in a gravity-dependent position. Feel she may benefit from skilled occupational therapy to maximize functional independence with home management of lymphedema. PROBLEM LIST (Impacting functional limitations):  1. Decreased Strength  2. Decreased ADL/Functional Activities  3. Increased Pain  4. Edema/Girth  5.  Decreased Skin Integrity/Hygeine INTERVENTIONS PLANNED: (Treatment may consist of any combination of the following)  1. Activities of daily living training  2. Manual therapy training  3. Therapeutic activity  4. Therapeutic exercise     GOALS: (Goals have been discussed and agreed upon with patient.)  Short-Term Functional Goals: Time Frame: 45 days  1. The patient/caregiver will verbalize understanding of lymphedema precautions. Met  2. Patient will be independent with skin care regimen. Met  3. Patient will be independent in lymphatic exercises. Met  Discharge Goals: Time Frame: 90 days  1. Patient's bilateral lower extremity circumferential measurements will decrease on volumetric graph by 1-3 cm to maximize functional use in ADL's. Met; continue for consistency  2. The patient/caregiver will be independent with home management of lymphedema. Continue to address  3. Patient/caregiver will be independent donning and doffing bilateral lower extremity compression garment. Continue to address    OUTCOME MEASURE:   Tool Used: Lymphedema Life Impact Scale   Score:  Initial: 47 Most Recent: X (Date: -- )   Interpretation of Score: The Lymphedema Life Impact Scale (LLIS) is a validated instrument that measures the physical, functional, and psychosocial concerns pertinent to patients with extremity lymphedema. The Scale's questionnaire is administered to patients to gauge impairments, activity limitations, and participation restrictions resulting from their lymphedema. MEDICAL NECESSITY:   · Patient demonstrates fair rehab potential due to higher previous functional level. REASON FOR SERVICES/OTHER COMMENTS:  · Patient continues to require skilled intervention due to decreased independence with home management of lymphedema. Total Duration:  OT Patient Time In/Time Out  Time In: 1300  Time Out: 1400    Rehabilitation Potential For Stated Goals: Fair  Regarding Rosario Rivera's therapy, I certify that the treatment plan above will be carried out by a therapist or under their direction.   Thank you for this referral,  Don Gutierrez OT     Referring Physician Signature: Esteban Mitchell MD _______________________________ Date _____________     PAIN/SUBJECTIVE:   Initial: Pain Intensity 1: 0   Post Session:  0/10   OCCUPATIONAL PROFILE & HISTORY:   History of Injury/Illness (Reason for Referral):  Patient reports chronic neuropathy pain and edema in LEs, worsening over the past 6 months to a year. She reports difficulty with all ADLs and iADLs and does not have assistance at home. 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 Past Medical History/Comorbidities:   Ms. Dhaval Reich  has a past medical history of Diabetes ([facility] [address] Hypertension, and Lung cancer ([facility] [address] (2017). Ms. Dhaval Reich  has no past surgical history on file. Social History/Living Environment:      Prior Level of Function/Work/Activity:  Disabled    Personal Factors:          Sex:  female        Age:  67 y.o. Ambulatory/Rehab Services H2 Model Falls Risk Assessment   Risk Factors:       No Risk Factors Identified Ability to Rise from Chair:       (3)  Multiple attempts, but successful   Falls Prevention Plan: Mobility Assistance Device (specify):  required wheelchair assistance from car to therapy clinic today   Total: (5 or greater = High Risk): 3   ©2010 [facility]. [facility] States Patent #[id number]. Federal Law prohibits the replication, distribution or use without written permission from [facility] of [address] [place] [address]   Current Medications:       Current Outpatient Medications:     albuterol (PROVENTIL HFA, VENTOLIN HFA, PROAIR HFA) 90 mcg/actuation inhaler, Take 2 Puffs by inhalation every four (4) hours as needed for Wheezing., Disp: 1 Inhaler, Rfl: 11    tolterodine ER (DETROL LA) 4 mg ER capsule, Take 1 Cap by mouth daily. , Disp: 30 Cap, Rfl: 11    solifenacin (VESICARE) 10 mg tablet, Take 1 Tab by mouth daily. , Disp: 30 Tab, Rfl: 12   insulin glargine (LANTUS SOLOSTAR) 100 unit/mL (3 mL) pen, by SubCUTAneous route., Disp: , Rfl:     mirabegron ER (MYRBETRIQ) 50 mg ER tablet, Take 1 Tab by mouth daily. , Disp: 30 Tab, Rfl: 12    naproxen (NAPROSYN) 500 mg tablet, Take 500 mg by mouth two (2) times daily (with meals). , Disp: , Rfl:     gabapentin (NEURONTIN) 100 mg capsule, Take 100 mg by mouth daily. , Disp: , Rfl:     simvastatin (ZOCOR) 40 mg tablet, Take 40 mg by mouth nightly., Disp: , Rfl:     lisinopril (PRINIVIL, ZESTRIL) 10 mg tablet, Take 10 mg by mouth daily. , Disp: , Rfl:     loratadine 10 mg cap, Take 10 mg by mouth daily. , Disp: , Rfl:     docusate sodium (COLACE) 100 mg capsule, Take 100 mg by mouth two (2) times a day., Disp: , Rfl:     FLUoxetine (PROZAC) 20 mg tablet, Take 20 mg by mouth daily. , Disp: , Rfl:     omeprazole (PRILOSEC) 20 mg capsule, Take 20 mg by mouth daily. , Disp: , Rfl:     guaiFENesin (ORGANIDIN) 400 mg tablet, Take 400 mg by mouth two (2) times a day., Disp: , Rfl:     phenazopyridine (PYRIDIUM) 100 mg tablet, Take 100 mg by mouth as needed for Pain., Disp: , Rfl:     insulin NPH (NOVOLIN N) 100 unit/mL injection, by SubCUTAneous route once., Disp: , Rfl:    Date Last Reviewed: 9/8/2021     Complexity Level: Expanded review of therapy/medical records (1-2):  MODERATE COMPLEXITY   ASSESSMENT OF OCCUPATIONAL PERFORMANCE:   Palpation:          Notable for pitting edema in bilateral LEs  ROM:          WDLs  Strength:          Grossly 3+/5 bilateral LEs  Skin Integrity:          Noted thickening hyperpigmentation around skin folds of feet; patient reports it is difficult for her to bathe her feet  Mental Status:                   Edema/Girth:  pitting    Left Right    Initial Most Recent Initial Most Recent   Upper  Extremity           Lower  Extremity 5th Tuberosity (cm): 21  Ankle (cm): 26.3  Calf (cm): 41.1  Mid Knee (cm): 39.7   5th Tuberosity (cm): 21.1  Ankle (cm): 26.5  Calf (cm): 40.3  Mid Knee (cm): 40.1        Activities of Daily Living:           Basic ADLs (From Assessment) Complex ADLs (From Assessment)         Grooming/Bathing/Dressing Activities of Daily Living                                   Sensation:         Patient reports neuropathy in bilateral LEs

## 2021-09-08 NOTE — PROGRESS NOTES
Tonio Davis  : 1945  Primary: Lisa [place] Medicare Hmo  Secondary:  [address]  at [address] [place] [address],[address], [place] [address].  [phone]   [phone]       OUTPATIENT OCCUPATIONAL THERAPY: Daily Treatment Note 2021  Visit Count:  10    ICD-10: Treatment Diagnosis:   Lymphedema, not elsewhere classified (I89.0)  Precautions/Allergies:   Aspirin, Barbiturates, Codeine, Demerol [meperidine], Indomethacin, Penicillins, and Sulfa (sulfonamide antibiotics)   TREATMENT PLAN:  Effective Dates: 2021 TO 2021 (90 days). Frequency/Duration: 2 times a week for 90 Day(s)    Pre-treatment Symptoms/Complaints:  Patient reports she has been busy caring for her mother who broke her tailbone and is in the hospital. She reports taking the compression bandages off on Monday. Pain: Initial: Pain Intensity 1: 0  Post Session:  0/10   Medications Last Reviewed:  2021  Updated Objective Findings:  see below   Edema/Girth:  pitting    Left Right    Initial Most Recent Initial Most Recent   Upper  Extremity           Lower  Extremity 5th Tuberosity (cm): 21  Ankle (cm): 26.3  Calf (cm): 41.1  Mid Knee (cm): 39.7   5th Tuberosity (cm): 21.1  Ankle (cm): 26.5  Calf (cm): 40.3  Mid Knee (cm): 40.1          TREATMENT:     Manual Therapy: (35 minutes): Manual lymphatic drainage was utilized and necessary because of the patient's bilateral LE lymphedema. Completed manual lymph drainage (MLD) of the LEs, beginning with static stationary circles at the inguinal nodes, then dynamic stationary circles from distal to proximal on the thigh, then the knee, the anterior and posterior lower leg, ankles, then dorsum of feet, 10 reps each. Self Care: (25 minutes): Procedure(s) (per grid) utilized to improve and/or restore self-care/home management as related to home management of lymphedema.  Required maximal verbal cueing to facilitate understanding of lymphedema plan of care. Patient arrives without compression bandages on. Assessed skin and took circumference measurements. Completed MLD as noted above. Patient's legs wrapped from base of toes to just below the popliteal crease with stockinette and padding then short-stretch compression bandages; educated her to wear until next appointment unless painful then remove; she verbalized and/or demonstrated understanding of all education. Treatment/Session Summary:    · Response to Treatment:  Patient awaiting [facility] to provide static compression garments; orthotics office called today and measurements given. Continue with complete decongestive therapy as patient is able to attend. · Communication/Consultation:  None today  · Equipment provided today:  None today  · Recommendations/Intent for next treatment session: Next visit will focus on maximizing independence with home management of lymphedema.     Total Treatment Billable Duration:  60 minutes  OT Patient Time In/Time Out  Time In: 1300  Time Out: 200 [facility],     Future Appointments   Date Time Provider Cesia Ramon   9/14/2021  1:45 PM Hernandez Lopez, OT [facility]   9/16/2021  2:30 PM Hernandez John, OT [facility]   9/20/2021  1:00 PM Hernandez John, OT [facility]   9/22/2021  1:00 PM [facility]   9/27/2021  1:00 PM Hernandez John, OT [facility]   9/29/2021  1:00 PM Hernandez John, OT [facility]     Visit # Therapist initials Date Arrived NS/ Cx < 24 hr >24 hr Cx Visit Comments   1 SP 7/26 x   Initial Assessment and Treatment  $35 co-pay   2 SP 7/29 x      3 SP 8/2 x      4 SP 8/5 x      5 SP 8/9 x       SP 8/11  x     6 SP 8/16 x      7 SP 8/19 x      8 SP 8/25 x       SP 8/27  x     9 SP 9/2 x      10 SP 9/8 x   Recert completed today                                                           Abbreviations: NS = No Show; CX = cancelled

## 2021-09-14 ENCOUNTER — HOSPITAL ENCOUNTER (OUTPATIENT)
Dept: PHYSICAL THERAPY | Age: 76
Discharge: HOME | End: 2021-09-14
Payer: MEDICARE

## 2021-09-14 NOTE — PROGRESS NOTES
Kp Dodson  : 1945  Primary: Rosina James Medicare Hmo  Secondary:  [facility] at [place] [address],[address], [facility] [address]  [phone]   [phone]          OUTPATIENT DAILY NOTE    NAME/AGE/GENDER: Kp Dodson is a 67 y.o. female. DATE: 2021    Patient cancelled (more than 24 hours ago) her appointment for today due to getting static compression garments and having home care assist with educating due to outpatient co-pay. Will plan to discharge if patient has no additional needs.      Lorrie Fontaine OT    Future Appointments   Date Time Provider Cesia Ramon   2021  7:00 PM Mich Ventura OT [facility]

## 2021-09-16 ENCOUNTER — APPOINTMENT (OUTPATIENT)
Dept: PHYSICAL THERAPY | Age: 76
End: 2021-09-16
Payer: MEDICARE

## 2021-09-17 ENCOUNTER — HOSPITAL ENCOUNTER (OUTPATIENT)
Dept: PHYSICAL THERAPY | Age: 76
Discharge: HOME | End: 2021-09-17
Payer: MEDICARE

## 2021-09-17 PROCEDURE — 97140 MANUAL THERAPY 1/> REGIONS: CPT

## 2021-09-17 PROCEDURE — 97535 SELF CARE MNGMENT TRAINING: CPT

## 2021-09-17 NOTE — PROGRESS NOTES
Dian Jack  : 1945  Primary: Sameer James Medicare Hmo  Secondary:  [address]  at [address] [address] [place] [address].  [phone]   [phone]       OUTPATIENT OCCUPATIONAL THERAPY: Daily Treatment Note 2021  Visit Count:  11    ICD-10: Treatment Diagnosis:   Lymphedema, not elsewhere classified (I89.0)  Precautions/Allergies:   Aspirin, Barbiturates, Codeine, Demerol [meperidine], Indomethacin, Penicillins, and Sulfa (sulfonamide antibiotics)   TREATMENT PLAN:  Effective Dates: 2021 TO 2021 (90 days). Frequency/Duration: 2 times a week for 90 Day(s)    Pre-treatment Symptoms/Complaints:  Patient reports she got the Velcro wraps from the [place] but the OT that was sent to her home was not trained to address her lymphedema, so she needs to continue with outpatient therapy. She reports she removed her compression bandages on Monday. Pain: Initial: Pain Intensity 1: 0  Post Session:  0/10   Medications Last Reviewed:  2021  Updated Objective Findings:  see below   Edema/Girth:  pitting    Left Right    Initial Most Recent Initial Most Recent   Upper  Extremity           Lower  Extremity 5th Tuberosity (cm): 21  Ankle (cm): 26.7  Calf (cm): 42.3  Mid Knee (cm): 38.3   5th Tuberosity (cm): 22.2  Ankle (cm): 27.8  Calf (cm): 41.4  Mid Knee (cm): 37.5          TREATMENT:     Manual Therapy: (20 minutes): Manual lymphatic drainage was utilized and necessary because of the patient's bilateral LE lymphedema. Completed manual lymph drainage (MLD) of the LEs, beginning with static stationary circles at the inguinal nodes, then dynamic stationary circles from distal to proximal on the thigh, then the knee, the anterior and posterior lower leg, ankles, then dorsum of feet, 5-10 reps each.      Self Care: (30 minutes): Procedure(s) (per grid) utilized to improve and/or restore self-care/home management as related to home management of lymphedema. Required maximal verbal cueing to facilitate understanding of lymphedema plan of care. Patient arrives without compression bandages on and reports she has not had them on since Monday. Assessed skin and took circumference measurements. Completed MLD as noted above. Donned new [place] Basic static compression garments to assess fit. Because her circumference measurements had increased since she has gone several days without compression on, patient's legs wrapped from base of toes to just below the popliteal crease with stockinette and padding then short-stretch compression bandages; educated her to wear until next appointment unless painful then remove; she verbalized and/or demonstrated understanding of all education. Treatment/Session Summary:    · Response to Treatment:  Patient will wear compression bandages until next visit early next week; if circumference measurements have decreased back to her best, will initiate use of static compression garments. · Communication/Consultation:  None today  · Equipment provided today:  None today  · Recommendations/Intent for next treatment session: Next visit will focus on maximizing independence with home management of lymphedema.     Total Treatment Billable Duration:  50 minutes  OT Patient Time In/Time Out  Time In: 1300  Time Out: 3150 [employer], OT    Future Appointments   Date Time Provider Cesia Ramon   9/21/2021  1:45 PM Richard Nicholson OT [facility]     Visit # Therapist initials Date Arrived NS/ Cx < 24 hr >24 hr Cx Visit Comments   1 SP 7/26 x   Initial Assessment and Treatment  $35 co-pay   2 SP 7/29 x      3 SP 8/2 x      4 SP 8/5 x      5 SP 8/9 x       SP 8/11  x     6 SP 8/16 x      7 SP 8/19 x      8 SP 8/25 x       SP 8/27  x     9 SP 9/2 x      10 SP 9/8 x   Recert completed today    SP 9/14   X    11 SP 9/17 x                                            Abbreviations: NS = No Show; CX = cancelled

## 2021-09-20 ENCOUNTER — APPOINTMENT (OUTPATIENT)
Dept: PHYSICAL THERAPY | Age: 76
End: 2021-09-20
Payer: MEDICARE

## 2021-09-21 ENCOUNTER — HOSPITAL ENCOUNTER (OUTPATIENT)
Dept: PHYSICAL THERAPY | Age: 76
Discharge: HOME | End: 2021-09-21
Payer: MEDICARE

## 2021-09-21 PROCEDURE — 97140 MANUAL THERAPY 1/> REGIONS: CPT

## 2021-09-21 PROCEDURE — 97535 SELF CARE MNGMENT TRAINING: CPT

## 2021-09-21 NOTE — PROGRESS NOTES
Olinda Yehuda  : 1945  Primary: Wai James Medicare Hmo  Secondary:  [address]  at [facility] [address] [address].  [phone]   [phone]       OUTPATIENT OCCUPATIONAL THERAPY: Daily Treatment Note 2021  Visit Count:  12    ICD-10: Treatment Diagnosis:   Lymphedema, not elsewhere classified (I89.0)  Precautions/Allergies:   Aspirin, Barbiturates, Codeine, Demerol [meperidine], Indomethacin, Penicillins, and Sulfa (sulfonamide antibiotics)   TREATMENT PLAN:  Effective Dates: 2021 TO 2021 (90 days). Frequency/Duration: 2 times a week for 90 Day(s)    Pre-treatment Symptoms/Complaints:  Patient reports she feels very tired but that her mother has taken a turn for the better recently so she is encouraged. Pain: Initial: Pain Intensity 1: 0  Post Session:  0/10   Medications Last Reviewed:  2021  Updated Objective Findings:  see below   Edema/Girth:  pitting    Left Right    Initial Most Recent Initial Most Recent   Upper  Extremity           Lower  Extremity 5th Tuberosity (cm): 20.2  Ankle (cm): 23.6  Calf (cm): 39.6  Mid Knee (cm): 38.5   5th Tuberosity (cm): 20.1  Ankle (cm): 23.8  Calf (cm): 38.1  Mid Knee (cm): 38.1          TREATMENT:     Manual Therapy: (15 minutes): Manual lymphatic drainage was utilized and necessary because of the patient's bilateral LE lymphedema. Completed manual lymph drainage (MLD) of the LEs, beginning with static stationary circles at the inguinal nodes, then dynamic stationary circles from distal to proximal on the thigh, then the knee, the anterior and posterior lower leg, ankles, then dorsum of feet, 5-10 reps each. Self Care: (40 minutes): Procedure(s) (per grid) utilized to improve and/or restore self-care/home management as related to home management of lymphedema. Required maximal verbal cueing to facilitate understanding of lymphedema plan of care.   Removed compression bandages. Assessed skin and took circumference measurements. Washed legs. Completed MLD as noted above. Donned new [facility] Basic static compression garments to assess fit. Patient then educated re: donning/doffing and daily wear schedule with removal for hygiene. She demonstrated donning them with verbal cuing from therapist.     Treatment/Session Summary:    · Response to Treatment:  Patient now using static compression garments; will assess effectiveness at next visit. · Communication/Consultation:  None today  · Equipment provided today:  None today  · Recommendations/Intent for next treatment session: Next visit will focus on maximizing independence with home management of lymphedema.     Total Treatment Billable Duration:  55 minutes  OT Patient Time In/Time Out  Time In: 9338  Time Out: [address] [employer] [facility], OT    Future Appointments   Date Time Provider Cesia Ramon   9/27/2021  2:30 PM Seema Carranza OT [facility]     Visit # Therapist initials Date Arrived NS/ Cx < 24 hr >24 hr Cx Visit Comments   1 SP 7/26 x   Initial Assessment and Treatment  $35 co-pay   2 SP 7/29 x      3 SP 8/2 x      4 SP 8/5 x      5 SP 8/9 x       SP 8/11  x     6 SP 8/16 x      7 SP 8/19 x      8 SP 8/25 x       SP 8/27  x     9 SP 9/2 x      10 SP 9/8 x   Recert completed today    SP 9/14   X    11 SP 9/17 x      12 SP 9/21 x                                   Abbreviations: NS = No Show; CX = cancelled

## 2021-09-22 ENCOUNTER — APPOINTMENT (OUTPATIENT)
Dept: PHYSICAL THERAPY | Age: 76
End: 2021-09-22
Payer: MEDICARE

## 2021-09-27 ENCOUNTER — HOSPITAL ENCOUNTER (OUTPATIENT)
Dept: PHYSICAL THERAPY | Age: 76
Discharge: HOME | End: 2021-09-27
Payer: MEDICARE

## 2021-09-27 ENCOUNTER — APPOINTMENT (OUTPATIENT)
Dept: PHYSICAL THERAPY | Age: 76
End: 2021-09-27
Payer: MEDICARE

## 2021-09-27 NOTE — PROGRESS NOTES
Obie Casarez  : 1945  Primary: Cece Dougherty Jacob Medicare Hmo  Secondary:  [facility] at [address] [address],[address] [facility]  [phone]   [phone]          OUTPATIENT DAILY NOTE    NAME/AGE/GENDER: Obie Casarez is a 67 y.o. female. DATE: 2021    Patient cancelled (less than 24 hours ago) her appointment for today due to not sleeping well last night and unable to drive today. Will plan to follow up on next scheduled visit. Ambika Kong OT    No future appointments.

## 2021-09-29 ENCOUNTER — APPOINTMENT (OUTPATIENT)
Dept: PHYSICAL THERAPY | Age: 76
End: 2021-09-29
Payer: MEDICARE

## 2021-11-23 ENCOUNTER — HOSPITAL ENCOUNTER (OUTPATIENT)
Dept: PHYSICAL THERAPY | Age: 76
Discharge: HOME | End: 2021-11-23
Payer: MEDICARE

## 2021-11-23 PROCEDURE — 97535 SELF CARE MNGMENT TRAINING: CPT

## 2021-11-23 NOTE — PROGRESS NOTES
Renetta Dickens  : 1945  Primary: [place] Medicare Hmo  Secondary:  [address] at [place] [address],[address] [place] [address].  [phone]   [phone]       OUTPATIENT OCCUPATIONAL THERAPY: Daily Treatment Note 2021  Visit Count:  13    ICD-10: Treatment Diagnosis:   Lymphedema, not elsewhere classified (I89.0)  Precautions/Allergies:   Aspirin, Barbiturates, Codeine, Demerol [meperidine], Indomethacin, Penicillins, and Sulfa (sulfonamide antibiotics)   TREATMENT PLAN:  Effective Dates: 2021 TO 2021 (90 days). Frequency/Duration: 2 times a week for 90 Day(s)    Pre-treatment Symptoms/Complaints:  Patient reports she is sorry she has not been able to attend therapy recently due to not being able to afford gas money to drive to therapy when she was having to drive to see her mother in skilled nursing care. She reports her mother is coming to stay with her sister in [facility] tomorrow. Pain: Initial: Pain Intensity 1: 0  Post Session:  0/10   Medications Last Reviewed:  2021  Updated Objective Findings:  see below   Edema/Girth:  pitting    Left Right    Initial Most Recent Initial Most Recent   Upper  Extremity           Lower  Extremity 5th Tuberosity (cm): 21.4  Ankle (cm): 27.4  Calf (cm): 41.6  Mid Knee (cm): 37.3   5th Tuberosity (cm): 21.2  Ankle (cm): 27.3  Calf (cm): 40.3  Mid Knee (cm): 36.4          TREATMENT:     Self Care: (30 minutes): Procedure(s) (per grid) utilized to improve and/or restore self-care/home management as related to home management of lymphedema. Required maximal verbal cueing to facilitate understanding of lymphedema plan of care. Patient arrived without compression on her LEs and reports she has not been able to get her static compression garments on recently. Assessed skin and took circumference measurements.  Patient's bilateral LEs wrapped from base of toes to just distal to popliteal crease with stockinette and padding then short-stretch compression bandages. Educated patient to remove if painful, and if she needs to remove bandages before next visit, she may try on static compression garments to see if they will fit; she verbalized understanding. Treatment/Session Summary:    · Response to Treatment:  Patient has not been to therapy since 9/21/21. She cancelled 2 scheduled appointments and recently called back to schedule. She is tolerating resumption of compression bandaging without complaint. · Communication/Consultation:  None today  · Equipment provided today:  None today  · Recommendations/Intent for next treatment session: Next visit will focus on maximizing independence with home management of lymphedema.     Total Treatment Billable Duration:  30 minutes  OT Patient Time In/Time Out  Time In: 1300  Time Out: 65 Elma Lucas OT    Future Appointments   Date Time Provider Cesia Ramon   11/30/2021  1:00 PM Lara March OT [facility] JUAN ANTONIO   12/7/2021  1:00 PM Lara March OT [facility] SFABBY     Visit # Therapist initials Date Arrived NS/ Cx < 24 hr >24 hr Cx Visit Comments   1 SP 7/26 x   Initial Assessment and Treatment  $35 co-pay   2 SP 7/29 x      3 SP 8/2 x      4 SP 8/5 x      5 SP 8/9 x       SP 8/11  x     6 SP 8/16 x      7 SP 8/19 x      8 SP 8/25 x       SP 8/27  x     9 SP 9/2 x      10 SP 9/8 x   Recert completed today    SP 9/14   X    11 SP 9/17 x      12 SP 9/21 x        9/27  x       10/11  x     13 SP 11/23 x        Abbreviations: NS = No Show; CX = cancelled

## 2021-11-30 ENCOUNTER — HOSPITAL ENCOUNTER (OUTPATIENT)
Dept: PHYSICAL THERAPY | Age: 76
Discharge: HOME | End: 2021-11-30
Payer: MEDICARE

## 2021-11-30 NOTE — PROGRESS NOTES
Mary Morales  : 1945  Primary: Opal Varner [employer] Medicare Hmo  Secondary:  Therapy Center at [address] [place] [address],[address] [facility]  [phone]   [phone]          OUTPATIENT DAILY NOTE    NAME/AGE/GENDER: Mary Morales is a 67 y.o. female. DATE: 2021    Patient cancelled (less than 24 hours ago) her appointment for today due to illness. Will plan to follow up on next scheduled visit.     Sharonda Dickerson OT    Future Appointments   Date Time Provider Cesia Ramon   2021  1:00 PM [facility]

## 2021-12-07 ENCOUNTER — HOSPITAL ENCOUNTER (OUTPATIENT)
Dept: PHYSICAL THERAPY | Age: 76
Discharge: HOME | End: 2021-12-07
Payer: MEDICARE

## 2021-12-07 PROCEDURE — 97535 SELF CARE MNGMENT TRAINING: CPT

## 2021-12-07 NOTE — PROGRESS NOTES
Obiemaxim Casarez  : 1945  Primary: Cece James Medicare Hmo  Secondary:  [address]  at [address] [address],[address] [facility]  [phone]   [phone]       OUTPATIENT OCCUPATIONAL THERAPY: Daily Treatment Note 2021  Visit Count:  14    ICD-10: Treatment Diagnosis:   Lymphedema, not elsewhere classified (I89.0)  Precautions/Allergies:   Aspirin, Barbiturates, Codeine, Demerol [meperidine], Indomethacin, Penicillins, and Sulfa (sulfonamide antibiotics)   TREATMENT PLAN:  Effective Dates: 2021 TO 2021 (90 days). Frequency/Duration: 2 times a week for 90 Day(s)    Pre-treatment Symptoms/Complaints:  Patient reports she removed her bandages earlier today but did not bring them with her to therapy today. Pain: Initial: Pain Intensity 1: 0  Post Session:  0/10   Medications Last Reviewed:  2021  Updated Objective Findings:  see below   Edema/Girth:  pitting    Left Right    Initial Most Recent Initial Most Recent   Upper  Extremity           Lower  Extremity 5th Tuberosity (cm): 20.7  Ankle (cm): 26.3  Calf (cm): 41.8  Mid Knee (cm): 39.3   5th Tuberosity (cm): 21.4  Ankle (cm): 27.3  Calf (cm): 41.9  Mid Knee (cm): 38.3          TREATMENT:     Self Care: (20 minutes): Procedure(s) (per grid) utilized to improve and/or restore self-care/home management as related to home management of lymphedema. Required maximal verbal cueing to facilitate understanding of lymphedema plan of care. Patient arrived without compression on her LEs and reports she must have left the bandages at home. Assessed skin and took circumference measurements. Donned static compression garments to bilateral LEs since bandages were not available. Discussed strategies to increase patient independence with donning; she verbalized understanding.     Treatment/Session Summary:    · Response to Treatment:  Patient reports she cannot obie static compression garments independently. Encouraged alternative methods; will follow up at next visit. · Communication/Consultation:  None today  · Equipment provided today:  None today  · Recommendations/Intent for next treatment session: Next visit will focus on maximizing independence with home management of lymphedema. Total Treatment Billable Duration:  20 minutes  OT Patient Time In/Time Out  Time In: 7039  Time Out: [address], OT    No future appointments.   Visit # Therapist initials Date Arrived NS/ Cx < 24 hr >24 hr Cx Visit Comments   1 SP 7/26 x   Initial Assessment and Treatment  $35 co-pay   2 SP 7/29 x      3 SP 8/2 x      4 SP 8/5 x      5 SP 8/9 x       SP 8/11  x     6 SP 8/16 x      7 SP 8/19 x      8 SP 8/25 x       SP 8/27  x     9 SP 9/2 x      10 SP 9/8 x   Recert completed today    SP 9/14   X    11 SP 9/17 x      12 SP 9/21 x        9/27  x       10/11  x     13 SP 11/23 x       Sp 11/30  x     14 SP 12/7 x                                   Abbreviations: NS = No Show; CX = cancelled

## 2022-03-19 PROBLEM — E11.29 TYPE II OR UNSPECIFIED TYPE DIABETES MELLITUS WITH RENAL MANIFESTATIONS, UNCONTROLLED(250.42): Status: ACTIVE | Noted: 2018-05-08

## 2022-03-19 PROBLEM — J41.0 SIMPLE CHRONIC BRONCHITIS (HCC): Status: ACTIVE | Noted: 2018-09-06

## 2022-03-19 PROBLEM — S42.309A CLOSED FRACTURE OF HUMERUS: Status: ACTIVE | Noted: 2017-06-26

## 2022-03-19 PROBLEM — E11.65 TYPE II OR UNSPECIFIED TYPE DIABETES MELLITUS WITH RENAL MANIFESTATIONS, UNCONTROLLED(250.42): Status: ACTIVE | Noted: 2018-05-08

## 2022-03-19 PROBLEM — Z72.0 TOBACCO ABUSE: Status: ACTIVE | Noted: 2018-09-06

## 2022-03-19 PROBLEM — R91.1 PULMONARY NODULE: Status: ACTIVE | Noted: 2018-09-06

## 2022-03-19 PROBLEM — N32.81 OVERACTIVE BLADDER: Status: ACTIVE | Noted: 2017-02-15

## 2022-03-20 PROBLEM — F42.4 EXCORIATION (SKIN-PICKING) DISORDER: Status: ACTIVE | Noted: 2018-05-08

## 2022-03-20 PROBLEM — S83.209A CURRENT TEAR OF MENISCUS OF KNEE: Status: ACTIVE | Noted: 2017-06-26

## 2024-10-16 ENCOUNTER — HOSPITAL ENCOUNTER (OUTPATIENT)
Dept: WOUND CARE | Age: 79
Discharge: HOME | End: 2024-10-16
Attending: FAMILY MEDICINE
Payer: OTHER GOVERNMENT

## 2024-10-16 VITALS — SYSTOLIC BLOOD PRESSURE: 164 MMHG | HEART RATE: 97 BPM | HEIGHT: 61 IN | DIASTOLIC BLOOD PRESSURE: 81 MMHG

## 2024-10-16 DIAGNOSIS — R60.9 LYMPHEDEMA DUE TO LIPEDEMA: Primary | Chronic | ICD-10-CM

## 2024-10-16 DIAGNOSIS — R60.0 LOWER EXTREMITY EDEMA: Chronic | ICD-10-CM

## 2024-10-16 DIAGNOSIS — I89.0 LYMPHEDEMA DUE TO LIPEDEMA: Primary | Chronic | ICD-10-CM

## 2024-10-16 PROCEDURE — 99212 OFFICE O/P EST SF 10 MIN: CPT

## 2024-10-16 PROCEDURE — 99203 OFFICE O/P NEW LOW 30 MIN: CPT

## 2024-10-16 ASSESSMENT — ENCOUNTER SYMPTOMS
NAUSEA: 0
VOMITING: 0

## 2024-10-16 NOTE — ASSESSMENT & PLAN NOTE
Assessment  No open wounds today; no blistering or weeping, no macerated skin  Plan  Will order CircAid's for patient; encouraged to wear daily  Patient requesting referral to lymphedema therapist Cinthya Burger; referral placed  Recommend patient moisturize legs daily with Vaseline  RTC PRN

## 2024-10-16 NOTE — PROGRESS NOTES
[facility]  History and Physical Note   Referring Provider: No ref. provider found     Sondra Ricketts  MEDICAL RECORD NUMBER: [id number]  AGE: 79 y.o.     GENDER: female    : 1945  EPISODE DATE: 10/16/2024    Subjective:   No chief complaint on file.     HISTORY of PRESENT ILLNESS     Sondra Ricketts is a 79 y.o. female who presents today for initial evaluation of a wound. Patient is new to the wound center. She presents alone. She had a small wound to her left anterior lower leg but it is now healed. She has no open wounds, no blistered weeping or macerated skin. She does have edema to her BLE with a history of lymphedema. The patient  has a past medical history of Diabetes (HCC), Hypertension, and Lung cancer (HCC). She is a current smoker. She reports swelling. She denies pain severity: none, drainage, redness, body aches, fever, and chills. VSS.    Review of Systems   Constitutional:  Negative for appetite change, chills and fever.   Cardiovascular:  Positive for leg swelling.   Gastrointestinal:  Negative for nausea and vomiting.   Musculoskeletal:  Negative for gait problem.   Skin:  Negative for wound.   Neurological:  Negative for numbness.   Hematological:  Does not bruise/bleed easily.      PAST MEDICAL HISTORY      Diagnosis Date    Diabetes (HCC)     Hypertension     Lung cancer (HCC)      PAST SURGICAL HISTORY  No past surgical history on file.  FAMILY HISTORY  No family history on file.  SOCIAL HISTORY  Social History     Tobacco Use    Smoking status: Every Day     Current packs/day: 1.00     Types: Cigarettes    Smokeless tobacco: Never   Substance Use Topics    Alcohol use: No    Drug use: No     ALLERGIES  Allergies   Allergen Reactions    Aspirin Other (See Comments)    Barbiturates Other (See Comments)    Codeine Other (See Comments)    Indomethacin Other (See Comments)    Meperidine Other (See Comments)    Penicillins Other (See Comments)

## 2024-10-16 NOTE — FLOWSHEET NOTE
10/16/24 1400   Right Leg Edema Point of Measurement   Leg circumference 39 cm   Ankle circumference 28 cm   Foot circumference 23 cm   Compression Therapy Compression ordered   Left Leg Edema Point of Measurement   Leg circumference 49 cm   Ankle circumference 23 cm   Foot circumference 21 cm   Compression Therapy Compression ordered

## 2024-10-16 NOTE — WOUND CARE
Discharge Instructions for  [facility]  [address] [place] [address]  [address] [place] [address]  Phone [phone]   Fax [phone]      NAME:  Sondra Ricketts  YOB: 1945  MEDICAL RECORD NUMBER:  [id number]  DATE:  10/16/2024    Return Appointment:   As needed with Subha Crowell NP      Instructions: BLE   No open wounds  Apply vaseline daily after showering  Referral sent to lymphedema clinic   New order placed for farrow wraps to replace old ones   Wear daily        Should you experience increased redness, swelling, pain, foul odor, size of wound(s), or have a temperature over 101 degrees please contact the Wound Healing Center at [phone] or if after hours contact your primary care physician or go to the hospital emergency department.    PLEASE NOTE: IF YOU ARE UNABLE TO OBTAIN WOUND SUPPLIES, CONTINUE TO USE THE SUPPLIES YOU HAVE AVAILABLE UNTIL YOU ARE ABLE TO REACH US. IT IS MOST IMPORTANT TO KEEP THE WOUND COVERED AT ALL TIMES.    Electronically signed Meryl Mack RN on 10/16/2024 at 2:01 PM

## 2024-10-16 NOTE — DISCHARGE INSTRUCTIONS
Discharge Instructions for  [facility]  [address] [place] [address]  Phone [phone]   Fax [phone]      NAME:  Sondra Ricketts  YOB: 1945  MEDICAL RECORD NUMBER:  [id number]  DATE:  @ED@    Return Appointment:   As needed with Subha Crowell NP      Instructions: ***        Should you experience increased redness, swelling, pain, foul odor, size of wound(s), or have a temperature over 101 degrees please contact the Wound Healing Center at [phone] or if after hours contact your primary care physician or go to the hospital emergency department.    PLEASE NOTE: IF YOU ARE UNABLE TO OBTAIN WOUND SUPPLIES, CONTINUE TO USE THE SUPPLIES YOU HAVE AVAILABLE UNTIL YOU ARE ABLE TO REACH US. IT IS MOST IMPORTANT TO KEEP THE WOUND COVERED AT ALL TIMES.    Electronically signed Meryl Mack RN on 10/16/2024 at 2:02 PM

## 2025-02-27 ENCOUNTER — OFFICE VISIT (OUTPATIENT)
Dept: ENDOCRINOLOGY | Age: 80
End: 2025-02-27

## 2025-02-27 ENCOUNTER — TELEPHONE (OUTPATIENT)
Dept: ENDOCRINOLOGY | Age: 80
End: 2025-02-27

## 2025-02-27 VITALS
BODY MASS INDEX: 30.89 KG/M2 | HEART RATE: 81 BPM | WEIGHT: 163.6 LBS | SYSTOLIC BLOOD PRESSURE: 122 MMHG | HEIGHT: 61 IN | OXYGEN SATURATION: 98 % | DIASTOLIC BLOOD PRESSURE: 82 MMHG

## 2025-02-27 DIAGNOSIS — E55.9 VITAMIN D DEFICIENCY: ICD-10-CM

## 2025-02-27 DIAGNOSIS — E83.52 HYPERCALCEMIA: ICD-10-CM

## 2025-02-27 DIAGNOSIS — R53.82 CHRONIC FATIGUE: ICD-10-CM

## 2025-02-27 DIAGNOSIS — M81.0 AGE-RELATED OSTEOPOROSIS WITHOUT CURRENT PATHOLOGICAL FRACTURE: Primary | ICD-10-CM

## 2025-02-27 RX ORDER — LOSARTAN POTASSIUM 25 MG/1
25 TABLET ORAL
COMMUNITY
Start: 2025-02-11

## 2025-02-27 RX ORDER — DULAGLUTIDE 1.5 MG/.5ML
1.5 INJECTION, SOLUTION SUBCUTANEOUS WEEKLY
COMMUNITY

## 2025-02-27 NOTE — TELEPHONE ENCOUNTER
Faxed medical release form to get records, Per Dr Lopes.    Please request last note and labs from [facility]; [facility], phone [phone]

## 2025-02-27 NOTE — PROGRESS NOTES
(PYRIDIUM) 100 MG tablet Take 100 mg by mouth as needed (Patient not taking: Reported on 2/27/2025)      simvastatin (ZOCOR) 40 MG tablet Take 40 mg by mouth (Patient not taking: Reported on 2/27/2025)      solifenacin (VESICARE) 10 MG tablet Take 10 mg by mouth daily (Patient not taking: Reported on 2/27/2025)      tolterodine (DETROL LA) 4 MG extended release capsule Take 4 mg by mouth daily (Patient not taking: Reported on 2/27/2025)       No current facility-administered medications for this visit.        Allergies    Allergies   Allergen Reactions    Aspirin Other (See Comments)    Barbiturates Other (See Comments)    Codeine Other (See Comments)    Indomethacin Other (See Comments)    Meperidine Other (See Comments)    Penicillins Other (See Comments)    Sulfa Antibiotics Other (See Comments)        /82 (Site: Left Upper Arm, Position: Sitting, Cuff Size: Medium Adult)   Pulse 81   Ht 1.549 m (5' 1\")   Wt 74.2 kg (163 lb 9.6 oz)   SpO2 98%   BMI 30.91 kg/m²     BP Readings from Last 3 Encounters:   02/27/25 122/82   10/16/24 (!) 164/81       Wt Readings from Last 6 Encounters:   02/27/25 74.2 kg (163 lb 9.6 oz)       Physical Exam  Constitutional:       General: She is not in acute distress.     Appearance: Normal appearance. She is obese. She is ill-appearing.   HENT:      Head: Normocephalic and atraumatic.   Cardiovascular:      Rate and Rhythm: Normal rate and regular rhythm.   Pulmonary:      Effort: Pulmonary effort is normal.   Neurological:      General: No focal deficit present.      Mental Status: She is alert and oriented to person, place, and time. Mental status is at baseline.      Motor: Weakness present.      Gait: Gait abnormal.   Psychiatric:         Mood and Affect: Mood normal.         Thought Content: Thought content normal.         Judgment: Judgment normal.         Return in about 4 weeks (around 3/27/2025) for Osteoporosis and Hypercalcemia.     Imtiaz Lopes, DO     On this

## 2025-02-28 ENCOUNTER — TELEPHONE (OUTPATIENT)
Dept: ENDOCRINOLOGY | Age: 80
End: 2025-02-28

## 2025-02-28 NOTE — ASSESSMENT & PLAN NOTE
She is at high risk for fracture and would benefit from anabolic therapy.  Patient is okay with daily or monthly injections.  She has a history of hypercalcemia on labs that were completed back in 2023.  So we will make a decision about anabolic pending lab results from today.

## 2025-03-06 ENCOUNTER — TELEPHONE (OUTPATIENT)
Dept: ENDOCRINOLOGY | Age: 80
End: 2025-03-06

## 2025-03-06 NOTE — TELEPHONE ENCOUNTER
Pt called stating she got a fax number for us to send her medication that was discussed at her appt for osteoporosis. [facility] then [facility] to us with something equal.    Make sure Rx has her  and SS# and address    [facility] [phone]

## 2025-03-10 NOTE — TELEPHONE ENCOUNTER
Spoke to the Pt, she didn't get her labs done due to a panic attack. She gave me the number to [facility] [phone] to get labs that she had done a couple of weeks ago.

## 2025-03-10 NOTE — TELEPHONE ENCOUNTER
Called [facility], spoke to the , left message for a nurse to call back and to faxed Pt's recent labs.

## 2025-03-14 NOTE — TELEPHONE ENCOUNTER
Left Voice mail at [phone] to call office regarding providers message below:    Imtiaz Lopes, Elizabeth Lance MA  Caller: Unspecified (1 week ago)  Please let the patient know that I reviewed the labs from [facility] and she has had persistent high calcium and low vitamin D since 11/2023, including most recently 1/2025. So I need to work this up to optimize electrolytes in addition to making a proper treatment plan. Does she want to do these labs at [facility] or an external lab?    Imtiaz Lopes, DO

## 2025-03-18 DIAGNOSIS — E83.52 HYPERCALCEMIA: Primary | ICD-10-CM

## 2025-03-18 DIAGNOSIS — E55.9 VITAMIN D DEFICIENCY: ICD-10-CM

## 2025-03-18 RX ORDER — POLYETHYLENE GLYCOL 3350 17 G/17G
17 POWDER, FOR SOLUTION ORAL DAILY
COMMUNITY

## 2025-03-18 NOTE — TELEPHONE ENCOUNTER
Spoke to the Pt, she states she can go to the lab at [facility]. And wanted to let you know she went to [facility] and had a scan done because she felt a tingling in her arm.

## 2025-04-30 ENCOUNTER — TELEPHONE (OUTPATIENT)
Dept: PULMONOLOGY | Age: 80
End: 2025-04-30

## 2025-04-30 NOTE — TELEPHONE ENCOUNTER
Outside referral from Dr. Sunil Lloyd for solitary pulmonary nodule. Imaging requested to PACS CT chest 4/18/25 and PET 4/22/25 IMPRESSION:   1. Part solid nodule in the right upper lobe does not have frankly suspicious features but it does show some change since August 2018. Recommend continued surveillance with a repeat CT chest without contrast in 6 months.     2.  Tubular structure in the medial aspect of the left base likely represents an obstructed bronchus. It is mildly more prominent since 2014. There is some increased uptake in the superior portion which could represent an obstructing lesion. Consider   bronchoscopy with tissue sampling.     3.  Right nephroliths.     4.  Sequela of previous pancreatitis in the pancreatic head.         Patient had prior PET at [facility] 8/2018 for comparison. Per chart, smoker but unknown pack year.  No known prior CA HX      IMPRESSION:  IMPRESSION:   1. Right upper lobe 11 mm groundglass focus without FDG activity. Indolent  neoplasm not excluded. Recommend short-term follow-up noncontrast chest CT in 3  months versus percutaneous sampling.  2. No abnormal FDG focus on this exam.  3. Sigmoid diverticulosis without CT evidence of acute diverticulitis.    Will ask referrals to schedule with Ms Sanchez

## 2025-05-02 NOTE — TELEPHONE ENCOUNTER
Spoke with Dr Hawkins who reviews imaging  R side, needs ION protocol CT prior to determining if nodules appropriate for BX.    Send labs at time of appointment. Aspergillus precipitins, IGE with diff and CBC.      I have spoken with [facility] [place] and they will push image to ION protocol and send back to PACs.       Patient is scheduled for 5/7 to see Mrs Sanchez.

## 2025-05-07 ENCOUNTER — OFFICE VISIT (OUTPATIENT)
Dept: PULMONOLOGY | Age: 80
End: 2025-05-07
Payer: OTHER GOVERNMENT

## 2025-05-07 VITALS
SYSTOLIC BLOOD PRESSURE: 151 MMHG | BODY MASS INDEX: 30.78 KG/M2 | WEIGHT: 163 LBS | OXYGEN SATURATION: 96 % | DIASTOLIC BLOOD PRESSURE: 84 MMHG | HEART RATE: 103 BPM | RESPIRATION RATE: 14 BRPM | HEIGHT: 61 IN

## 2025-05-07 DIAGNOSIS — Z72.0 TOBACCO ABUSE: ICD-10-CM

## 2025-05-07 DIAGNOSIS — Z87.09 HISTORY OF CHRONIC BRONCHITIS: ICD-10-CM

## 2025-05-07 DIAGNOSIS — R91.1 PULMONARY NODULE: Primary | ICD-10-CM

## 2025-05-07 PROCEDURE — 1123F ACP DISCUSS/DSCN MKR DOCD: CPT

## 2025-05-07 PROCEDURE — 99215 OFFICE O/P EST HI 40 MIN: CPT

## 2025-05-07 NOTE — PROGRESS NOTES
Ms. Ricketts is a 79 year old female with a pmhx of HTN, HLD, CVA, T2DM, simple chronic bronchitis, lymphedema/lipedema, COPD, vitamin D deficiency, osteoporosis, OAB/incontinence, and current tobacco use.  *sine note*    Only time she quit smoking was cold turkey while hospitalized. She has tried nicotine patches and it \"broke her out\". Tried Wellbutrin and they caused vomiting. Not interested in quitting smoking at this time.     Smoking history: 65 pack years.   Family history: Sister has cancer but patient is unclear on what it is. Her other sister had melanoma of the skin.  Exposures: Second hand smoke. 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   Symptoms: LANGFORD with minimal activity and requires a wheelchair to get around.   She fell last week and called EMS but did not go to the hospital. Dry cough for the past few months with associated wheeze. She has an inhaler she doesn't use because she does not like using it. She says it \"burnt my mouth\". Her SOB has increased in the past two to three years. No fevers. Not regular night sweats. Her weight has been stable and she has a good appetite.   +chronic LE edema.       In 2018, she had a Chest CT that showed a 11mm GGO in the RUL. PET scan showed no uptake. I do not see where any CT surveillance was done.    Chest CT from 4/18/2025 showed interval progression of a part solid 1.9cm RUL nodule which recommendation to repeat a CT in three months. There was also a new 6mm GGO in the RUL, a 6mm LLL endobronchial mucoid debris VS nodule and a benign 3mm perifissural RUL nodule. CT showed redemonstration of finger in glove radiopacities within the L lung base since 2018. PET scan was performed and showed low level update with SUV max of 1.7. The uptake appeared to be more focused on the smaller solid component (6mm X 7mm).     Dr. Howard reviewed imaging and records. There is a question of 
Patient unable to have IV contrast due to Iodine allergy.  Discussed with Dr Galvan, patient to be scheduled for EHCO with bubble study to rule out AVM. Patient wishes to only have this image done at [facility].   
18y+), IM, 50mcg/0.25mL 10/27/2021    COVID-19, PFIZER, 2024/25, (age 12y+), IM, 30mcg/0.3mL 12/14/2023     Past Medical History:   Diagnosis Date    Hypertension     Lung cancer (HCC) 2017        Tobacco Use      Smoking status: Every Day        Packs/day: 1.00        Years: 1 pack/day for 62.3 years (62.3 ttl pk-yrs)        Types: Cigarettes        Start date: 1963      Smokeless tobacco: Never    Allergies   Allergen Reactions    Aspirin Other (See Comments)    Barbiturates Other (See Comments)    Codeine Other (See Comments)    Indomethacin Other (See Comments)    Iodine Other (See Comments)     Patient does not remember    Meperidine Other (See Comments)    Penicillins Other (See Comments)    Sulfa Antibiotics Other (See Comments)     Current Outpatient Medications   Medication Instructions    albuterol sulfate  (90 Base) MCG/ACT inhaler 2 puffs, EVERY 4 HOURS PRN    docusate (COLACE, DULCOLAX) 100 mg, 2 TIMES DAILY    FLUoxetine HCl (PMDD) 20 mg, DAILY    gabapentin (NEURONTIN) 100 mg, DAILY    guaiFENesin 400 mg, 2 TIMES DAILY    insulin glargine (LANTUS;BASAGLAR) 100 UNIT/ML injection pen Inject into the skin    insulin NPH (HUMULIN N;NOVOLIN N) 100 UNIT/ML injection vial ONCE    lisinopril (PRINIVIL;ZESTRIL) 10 mg, DAILY    loratadine (CLARITIN) 10 mg, DAILY    losartan (COZAAR) 25 mg    mirabegron (MYRBETRIQ) 50 mg, DAILY    Multiple Vitamins-Minerals (PRESERVISION AREDS 2 PO) 2 tablets, DAILY    naproxen (NAPROSYN) 500 mg, 2 TIMES DAILY WITH MEALS    omeprazole (PRILOSEC) 20 mg, DAILY    phenazopyridine (PYRIDIUM) 100 mg, PRN    polyethylene glycol (GLYCOLAX) 17 g, DAILY    simvastatin (ZOCOR) 40 mg    solifenacin (VESICARE) 10 mg, DAILY    tolterodine (DETROL LA) 4 mg, DAILY    Trulicity 1.5 mg, WEEKLY    vitamin D 1,000 Units, DAILY         Danii Sanchez APRN - CNP  [facility]  [address] [place] [address] [address]  [place] [address]  Office : [phone]  Fax : [phone]

## 2025-05-08 ENCOUNTER — TELEPHONE (OUTPATIENT)
Dept: PULMONOLOGY | Age: 80
End: 2025-05-08

## 2025-05-08 DIAGNOSIS — E83.52 HYPERCALCEMIA: ICD-10-CM

## 2025-05-08 DIAGNOSIS — E55.9 VITAMIN D DEFICIENCY: ICD-10-CM

## 2025-05-08 DIAGNOSIS — R91.1 PULMONARY NODULE: ICD-10-CM

## 2025-05-08 LAB
25(OH)D3 SERPL-MCNC: 24.4 NG/ML (ref 30–100)
ALBUMIN SERPL-MCNC: 3.5 G/DL (ref 3.2–4.6)
ALBUMIN/GLOB SERPL: 1.3 (ref 1–1.9)
ALP SERPL-CCNC: 100 U/L (ref 35–104)
ALT SERPL-CCNC: 15 U/L (ref 8–45)
ANION GAP SERPL CALC-SCNC: 11 MMOL/L (ref 7–16)
AST SERPL-CCNC: 16 U/L (ref 15–37)
BASOPHILS # BLD: 0.05 K/UL (ref 0–0.2)
BASOPHILS NFR BLD: 0.7 % (ref 0–2)
BILIRUB SERPL-MCNC: 0.4 MG/DL (ref 0–1.2)
BUN SERPL-MCNC: 14 MG/DL (ref 8–23)
CA-I BLD-MCNC: 5.25 MG/DL (ref 4–5.2)
CALCIUM SERPL-MCNC: 10.2 MG/DL (ref 8.8–10.2)
CALCIUM SERPL-MCNC: 10.3 MG/DL (ref 8.8–10.2)
CHLORIDE SERPL-SCNC: 104 MMOL/L (ref 98–107)
CO2 SERPL-SCNC: 24 MMOL/L (ref 20–29)
CREAT SERPL-MCNC: 0.66 MG/DL (ref 0.6–1.1)
DIFFERENTIAL METHOD BLD: NORMAL
EOSINOPHIL # BLD: 0.2 K/UL (ref 0–0.8)
EOSINOPHIL NFR BLD: 2.7 % (ref 0.5–7.8)
ERYTHROCYTE [DISTWIDTH] IN BLOOD BY AUTOMATED COUNT: 13.1 % (ref 11.9–14.6)
GLOBULIN SER CALC-MCNC: 2.8 G/DL (ref 2.3–3.5)
GLUCOSE SERPL-MCNC: 204 MG/DL (ref 70–99)
HCT VFR BLD AUTO: 43.7 % (ref 35.8–46.3)
HGB BLD-MCNC: 14.3 G/DL (ref 11.7–15.4)
IMM GRANULOCYTES # BLD AUTO: 0.02 K/UL (ref 0–0.5)
IMM GRANULOCYTES NFR BLD AUTO: 0.3 % (ref 0–5)
LYMPHOCYTES # BLD: 1.5 K/UL (ref 0.5–4.6)
LYMPHOCYTES NFR BLD: 20.1 % (ref 13–44)
MAGNESIUM SERPL-MCNC: 2.2 MG/DL (ref 1.8–2.4)
MCH RBC QN AUTO: 28.6 PG (ref 26.1–32.9)
MCHC RBC AUTO-ENTMCNC: 32.7 G/DL (ref 31.4–35)
MCV RBC AUTO: 87.4 FL (ref 82–102)
MONOCYTES # BLD: 0.52 K/UL (ref 0.1–1.3)
MONOCYTES NFR BLD: 7 % (ref 4–12)
NEUTS SEG # BLD: 5.18 K/UL (ref 1.7–8.2)
NEUTS SEG NFR BLD: 69.2 % (ref 43–78)
NRBC # BLD: 0 K/UL (ref 0–0.2)
PHOSPHATE SERPL-MCNC: 3.1 MG/DL (ref 2.5–4.5)
PLATELET # BLD AUTO: 213 K/UL (ref 150–450)
PMV BLD AUTO: 10.2 FL (ref 9.4–12.3)
POTASSIUM SERPL-SCNC: 4.5 MMOL/L (ref 3.5–5.1)
PROT SERPL-MCNC: 6.3 G/DL (ref 6.3–8.2)
PTH-INTACT SERPL-MCNC: 57.3 PG/ML (ref 15–65)
RBC # BLD AUTO: 5 M/UL (ref 4.05–5.2)
SODIUM SERPL-SCNC: 140 MMOL/L (ref 136–145)
WBC # BLD AUTO: 7.5 K/UL (ref 4.3–11.1)

## 2025-05-09 ENCOUNTER — RESULTS FOLLOW-UP (OUTPATIENT)
Dept: ENDOCRINOLOGY | Age: 80
End: 2025-05-09

## 2025-05-09 ENCOUNTER — TELEPHONE (OUTPATIENT)
Dept: PULMONOLOGY | Age: 80
End: 2025-05-09

## 2025-05-09 DIAGNOSIS — R91.1 PULMONARY NODULE: Primary | ICD-10-CM

## 2025-05-09 NOTE — TELEPHONE ENCOUNTER
I have called patient.  She reports that she does not want to have ECHO at [facility] due to prior experience.  Ultimately patient decides she wants to have ECHO at [facility].  Order faxed to that facility [phone].  I have spoken with [facility] and ECHO with them cancelled at request of patient.  Ms Ricketts will call this office to notify when EHCO is completed.

## 2025-05-09 NOTE — TELEPHONE ENCOUNTER
Patient is wanting a call from Danii VELIZ. She is upset that this appt with the ECHO was schedule with out her knowing it .

## 2025-05-10 LAB — IGE SERPL-ACNC: 110 IU/ML (ref 6–495)

## 2025-05-11 LAB
A FLAVUS AB SER QL ID: NEGATIVE
A FUMIGATUS AB SER QL ID: NEGATIVE
A NIGER AB SER QL ID: NEGATIVE

## 2025-05-14 ENCOUNTER — TELEPHONE (OUTPATIENT)
Dept: PULMONOLOGY | Age: 80
End: 2025-05-14

## 2025-05-14 DIAGNOSIS — R91.1 PULMONARY NODULE: Primary | ICD-10-CM

## 2025-05-14 NOTE — TELEPHONE ENCOUNTER
Patient discussed at thoracic conference by Dr Howard with Ms Daniel present.   Per group, patient has LLL and RUL nodules that are evolving since 2018.  Patient has ECHO scheduled on 5/19.  Per group, patient needs ION BX to both nodules.  Group discusses likely davila of LLL lesion being bronchocele or AVM is low. I have spoken with patient.  She is aware of discussion at thoracic conference and reports that she is not inclined to allow BX as she would not want surgery.  We have discussed that tissue is needed to determine any treatment options.  She will have her sister Nuria Morgan [phone] call office to schedule when she returns from vacation. She gives this office permission to discuss with Nuria.  This call was witnessed by Danii ALBERTO.

## 2025-05-15 ENCOUNTER — TELEPHONE (OUTPATIENT)
Dept: PULMONOLOGY | Age: 80
End: 2025-05-15

## 2025-05-16 NOTE — TELEPHONE ENCOUNTER
5/15/25 9:17 am  Patient ask if it is important for her to get the EHCO done since she has decided to not have the biopsy, ask for a call back

## 2025-05-19 ENCOUNTER — RESULTS FOLLOW-UP (OUTPATIENT)
Dept: PULMONOLOGY | Age: 80
End: 2025-05-19

## 2025-05-19 NOTE — TELEPHONE ENCOUNTER
----- Message from CIARA Stevenson CNP sent at 5/12/2025  9:21 AM EDT -----  These are negative for any concern for an infection/inflammation based on these labs. Please let her know. =)  ----- Message -----  From: Nav Ferguson Incoming [place] [facility]  Sent: 5/8/2025   7:24 PM EDT  To: CIARA Stevenson CNP    I have notified patient. She has ECHO today.

## 2025-05-21 NOTE — TELEPHONE ENCOUNTER
Noted patient moved ECHO to 5/28. I have called patient to check readiness to schedule ION BX.  Patient reports that she is not going to schedule any biopsy at this time. She will call if she changes her mind.     Ms Tarrant to make you aware.  She is aware that with declining BX, you will likely want chest imaging repeated at unknown interval.

## 2025-05-22 NOTE — TELEPHONE ENCOUNTER
Since she is not following through with recommended biopsy, we can repeat Chest CT 3 months from the previous one. She 'no show'ed her PFT appt and she was complaining of increased SOB and decrease in performance status at our appt. She has not seen a pulmonologist in many years and needs her COPD treated. So if it is OK with Dr. Howard, I think she should have PFTs rescheduled and see him following that as well as repeat chest CT to discuss COPD tx and nodule plan.   Please make her aware that if the CT shows the same or increased areas of concern, a biopsy will still be recommended to determine her treatment.     I have left patient a message to call office or radiology scheduling to schedule 3 month CT chest.  She will need to see Dr Howard and have CPFT prior post this image. [facility] good til 11/3/2025.

## 2025-06-02 NOTE — TELEPHONE ENCOUNTER
Left detailed msg to call rad scheduling to set up CT and our office # to set up appt w/ Dr. Howard & breathing test after CT scan.

## 2025-06-30 ENCOUNTER — TELEPHONE (OUTPATIENT)
Dept: PULMONOLOGY | Age: 80
End: 2025-06-30